# Patient Record
Sex: MALE | Race: WHITE | Employment: UNEMPLOYED | URBAN - METROPOLITAN AREA
[De-identification: names, ages, dates, MRNs, and addresses within clinical notes are randomized per-mention and may not be internally consistent; named-entity substitution may affect disease eponyms.]

---

## 2017-09-22 ENCOUNTER — COMMUNICATION - HEALTHEAST (OUTPATIENT)
Dept: SCHEDULING | Facility: CLINIC | Age: 37
End: 2017-09-22

## 2020-04-06 ENCOUNTER — APPOINTMENT (OUTPATIENT)
Dept: CT IMAGING | Facility: CLINIC | Age: 40
End: 2020-04-06
Attending: NURSE PRACTITIONER
Payer: MEDICAID

## 2020-04-06 ENCOUNTER — HOSPITAL ENCOUNTER (INPATIENT)
Facility: CLINIC | Age: 40
LOS: 4 days | Discharge: HOME | End: 2020-04-10
Attending: NURSE PRACTITIONER | Admitting: FAMILY MEDICINE
Payer: MEDICAID

## 2020-04-06 DIAGNOSIS — K85.20 ALCOHOL-INDUCED ACUTE PANCREATITIS WITHOUT INFECTION OR NECROSIS: ICD-10-CM

## 2020-04-06 DIAGNOSIS — R10.13 ABDOMINAL PAIN, EPIGASTRIC: ICD-10-CM

## 2020-04-06 DIAGNOSIS — E78.1 HYPERTRIGLYCERIDEMIA: Primary | ICD-10-CM

## 2020-04-06 DIAGNOSIS — E87.6 HYPOKALEMIA: ICD-10-CM

## 2020-04-06 DIAGNOSIS — E87.1 HYPONATREMIA: ICD-10-CM

## 2020-04-06 PROBLEM — E78.5 HYPERLIPIDEMIA: Status: ACTIVE | Noted: 2020-04-06

## 2020-04-06 PROBLEM — R74.8 ELEVATED LIPASE: Status: ACTIVE | Noted: 2018-09-11

## 2020-04-06 PROBLEM — F31.9 BIPOLAR AFFECTIVE DISORDER (H): Status: ACTIVE | Noted: 2020-04-06

## 2020-04-06 PROBLEM — I10 HYPERTENSION: Status: ACTIVE | Noted: 2020-04-06

## 2020-04-06 PROBLEM — G47.33 OSA (OBSTRUCTIVE SLEEP APNEA): Status: ACTIVE | Noted: 2020-04-06

## 2020-04-06 LAB
ALBUMIN SERPL-MCNC: 2.2 G/DL (ref 3.4–5)
ALBUMIN UR-MCNC: NEGATIVE MG/DL
ALP SERPL-CCNC: 200 U/L (ref 40–150)
ALT SERPL W P-5'-P-CCNC: 281 U/L (ref 0–70)
ANION GAP SERPL CALCULATED.3IONS-SCNC: 5 MMOL/L (ref 3–14)
APPEARANCE UR: CLEAR
AST SERPL W P-5'-P-CCNC: 308 U/L (ref 0–45)
BASOPHILS # BLD AUTO: 0 10E9/L (ref 0–0.2)
BASOPHILS NFR BLD AUTO: 0 %
BILIRUB SERPL-MCNC: 4.6 MG/DL (ref 0.2–1.3)
BILIRUB UR QL STRIP: NEGATIVE
BUN SERPL-MCNC: 12 MG/DL (ref 7–30)
CALCIUM SERPL-MCNC: 8.1 MG/DL (ref 8.5–10.1)
CHLORIDE SERPL-SCNC: 89 MMOL/L (ref 94–109)
CO2 SERPL-SCNC: 28 MMOL/L (ref 20–32)
COLOR UR AUTO: YELLOW
CREAT SERPL-MCNC: 0.8 MG/DL (ref 0.66–1.25)
DIFFERENTIAL METHOD BLD: ABNORMAL
EOSINOPHIL # BLD AUTO: 0.1 10E9/L (ref 0–0.7)
EOSINOPHIL NFR BLD AUTO: 1 %
ERYTHROCYTE [DISTWIDTH] IN BLOOD BY AUTOMATED COUNT: 13.4 % (ref 10–15)
ETHANOL SERPL-MCNC: <0.01 G/DL
GFR SERPL CREATININE-BSD FRML MDRD: >90 ML/MIN/{1.73_M2}
GLUCOSE SERPL-MCNC: 344 MG/DL (ref 70–99)
GLUCOSE UR STRIP-MCNC: NEGATIVE MG/DL
HCT VFR BLD AUTO: 37.3 % (ref 40–53)
HGB BLD-MCNC: 18.6 G/DL (ref 13.3–17.7)
HGB UR QL STRIP: ABNORMAL
INR PPP: 1.13 (ref 0.86–1.14)
KETONES UR STRIP-MCNC: NEGATIVE MG/DL
LEUKOCYTE ESTERASE UR QL STRIP: NEGATIVE
LIPASE SERPL-CCNC: ABNORMAL U/L (ref 73–393)
LYMPHOCYTES # BLD AUTO: 1.9 10E9/L (ref 0.8–5.3)
LYMPHOCYTES NFR BLD AUTO: 18 %
MAGNESIUM SERPL-MCNC: 1.4 MG/DL (ref 1.6–2.3)
MCH RBC QN AUTO: 46.4 PG (ref 26.5–33)
MCHC RBC AUTO-ENTMCNC: 49.9 G/DL (ref 31.5–36.5)
MCV RBC AUTO: 93 FL (ref 78–100)
MONOCYTES # BLD AUTO: 0.6 10E9/L (ref 0–1.3)
MONOCYTES NFR BLD AUTO: 6 %
MUCOUS THREADS #/AREA URNS LPF: PRESENT /LPF
NEUTROPHILS # BLD AUTO: 8 10E9/L (ref 1.6–8.3)
NEUTROPHILS NFR BLD AUTO: 75 %
NITRATE UR QL: NEGATIVE
PH UR STRIP: 6 PH (ref 5–7)
PLATELET # BLD AUTO: 211 10E9/L (ref 150–450)
POTASSIUM SERPL-SCNC: 3.3 MMOL/L (ref 3.4–5.3)
PROT SERPL-MCNC: 7.1 G/DL (ref 6.8–8.8)
RBC # BLD AUTO: 4.01 10E12/L (ref 4.4–5.9)
RBC #/AREA URNS AUTO: <1 /HPF (ref 0–2)
SODIUM SERPL-SCNC: 122 MMOL/L (ref 133–144)
SOURCE: ABNORMAL
SP GR UR STRIP: 1.03 (ref 1–1.03)
UROBILINOGEN UR STRIP-MCNC: 0 MG/DL (ref 0–2)
VARIANT LYMPHS BLD QL SMEAR: PRESENT
WBC # BLD AUTO: 10.7 10E9/L (ref 4–11)
WBC #/AREA URNS AUTO: <1 /HPF (ref 0–5)

## 2020-04-06 PROCEDURE — 85025 COMPLETE CBC W/AUTO DIFF WBC: CPT | Performed by: EMERGENCY MEDICINE

## 2020-04-06 PROCEDURE — 80320 DRUG SCREEN QUANTALCOHOLS: CPT | Performed by: NURSE PRACTITIONER

## 2020-04-06 PROCEDURE — 96360 HYDRATION IV INFUSION INIT: CPT | Mod: 59 | Performed by: EMERGENCY MEDICINE

## 2020-04-06 PROCEDURE — 25000128 H RX IP 250 OP 636: Performed by: NURSE PRACTITIONER

## 2020-04-06 PROCEDURE — 83036 HEMOGLOBIN GLYCOSYLATED A1C: CPT | Performed by: FAMILY MEDICINE

## 2020-04-06 PROCEDURE — 81001 URINALYSIS AUTO W/SCOPE: CPT | Performed by: NURSE PRACTITIONER

## 2020-04-06 PROCEDURE — 99285 EMERGENCY DEPT VISIT HI MDM: CPT | Mod: Z6 | Performed by: EMERGENCY MEDICINE

## 2020-04-06 PROCEDURE — 25000125 ZZHC RX 250: Performed by: NURSE PRACTITIONER

## 2020-04-06 PROCEDURE — 99285 EMERGENCY DEPT VISIT HI MDM: CPT | Mod: 25 | Performed by: EMERGENCY MEDICINE

## 2020-04-06 PROCEDURE — 85610 PROTHROMBIN TIME: CPT | Performed by: NURSE PRACTITIONER

## 2020-04-06 PROCEDURE — 83735 ASSAY OF MAGNESIUM: CPT | Performed by: EMERGENCY MEDICINE

## 2020-04-06 PROCEDURE — 74177 CT ABD & PELVIS W/CONTRAST: CPT

## 2020-04-06 PROCEDURE — 20000003 ZZH R&B ICU

## 2020-04-06 PROCEDURE — 83690 ASSAY OF LIPASE: CPT | Performed by: EMERGENCY MEDICINE

## 2020-04-06 PROCEDURE — 80053 COMPREHEN METABOLIC PANEL: CPT | Performed by: EMERGENCY MEDICINE

## 2020-04-06 PROCEDURE — 84100 ASSAY OF PHOSPHORUS: CPT | Performed by: NURSE PRACTITIONER

## 2020-04-06 PROCEDURE — 25800030 ZZH RX IP 258 OP 636: Performed by: NURSE PRACTITIONER

## 2020-04-06 RX ORDER — SODIUM CHLORIDE 9 MG/ML
1000 INJECTION, SOLUTION INTRAVENOUS CONTINUOUS
Status: DISCONTINUED | OUTPATIENT
Start: 2020-04-06 | End: 2020-04-07

## 2020-04-06 RX ORDER — LOVASTATIN 10 MG
TABLET ORAL
COMMUNITY
End: 2020-04-06

## 2020-04-06 RX ORDER — QUETIAPINE FUMARATE 200 MG/1
TABLET, FILM COATED ORAL
COMMUNITY
End: 2020-04-06

## 2020-04-06 RX ORDER — ZOLPIDEM TARTRATE 5 MG/1
5 TABLET ORAL
COMMUNITY
Start: 2018-09-18 | End: 2020-04-06

## 2020-04-06 RX ORDER — LITHIUM CARBONATE 300 MG/1
CAPSULE ORAL
COMMUNITY
End: 2020-04-06

## 2020-04-06 RX ORDER — IOPAMIDOL 755 MG/ML
100 INJECTION, SOLUTION INTRAVASCULAR ONCE
Status: COMPLETED | OUTPATIENT
Start: 2020-04-06 | End: 2020-04-06

## 2020-04-06 RX ORDER — LISINOPRIL 10 MG/1
TABLET ORAL
COMMUNITY
End: 2020-04-06

## 2020-04-06 RX ORDER — AMLODIPINE BESYLATE 5 MG/1
5 TABLET ORAL
COMMUNITY
Start: 2018-09-14 | End: 2020-04-06

## 2020-04-06 RX ADMIN — SODIUM CHLORIDE 1000 ML: 9 INJECTION, SOLUTION INTRAVENOUS at 22:45

## 2020-04-06 RX ADMIN — IOPAMIDOL 100 ML: 755 INJECTION, SOLUTION INTRAVENOUS at 22:32

## 2020-04-06 RX ADMIN — SODIUM CHLORIDE 71 ML: 9 INJECTION, SOLUTION INTRAVENOUS at 22:32

## 2020-04-06 RX ADMIN — FOLIC ACID: 5 INJECTION, SOLUTION INTRAMUSCULAR; INTRAVENOUS; SUBCUTANEOUS at 23:34

## 2020-04-06 ASSESSMENT — MIFFLIN-ST. JEOR: SCORE: 2012.62

## 2020-04-07 PROBLEM — K85.90 ACUTE PANCREATITIS: Status: ACTIVE | Noted: 2020-04-07

## 2020-04-07 LAB
ALBUMIN SERPL-MCNC: 2.6 G/DL (ref 3.4–5)
ALP SERPL-CCNC: 170 U/L (ref 40–150)
ALT SERPL W P-5'-P-CCNC: 136 U/L (ref 0–70)
ANION GAP SERPL CALCULATED.3IONS-SCNC: 10 MMOL/L (ref 3–14)
ANION GAP SERPL CALCULATED.3IONS-SCNC: 3 MMOL/L (ref 3–14)
AST SERPL W P-5'-P-CCNC: 193 U/L (ref 0–45)
BILIRUB DIRECT SERPL-MCNC: 1.7 MG/DL (ref 0–0.2)
BILIRUB SERPL-MCNC: 2.6 MG/DL (ref 0.2–1.3)
BUN SERPL-MCNC: 17 MG/DL (ref 7–30)
BUN SERPL-MCNC: 8 MG/DL (ref 7–30)
CALCIUM SERPL-MCNC: 7.7 MG/DL (ref 8.5–10.1)
CALCIUM SERPL-MCNC: 8.1 MG/DL (ref 8.5–10.1)
CHLORIDE SERPL-SCNC: 100 MMOL/L (ref 94–109)
CHLORIDE SERPL-SCNC: 93 MMOL/L (ref 94–109)
CHOLEST SERPL-MCNC: 311 MG/DL
CO2 SERPL-SCNC: 22 MMOL/L (ref 20–32)
CO2 SERPL-SCNC: 30 MMOL/L (ref 20–32)
CREAT SERPL-MCNC: 0.68 MG/DL (ref 0.66–1.25)
CREAT SERPL-MCNC: <0.14 MG/DL (ref 0.66–1.25)
ERYTHROCYTE [DISTWIDTH] IN BLOOD BY AUTOMATED COUNT: 13.5 % (ref 10–15)
GFR SERPL CREATININE-BSD FRML MDRD: >90 ML/MIN/{1.73_M2}
GFR SERPL CREATININE-BSD FRML MDRD: >90 ML/MIN/{1.73_M2}
GLUCOSE BLDC GLUCOMTR-MCNC: 132 MG/DL (ref 70–99)
GLUCOSE BLDC GLUCOMTR-MCNC: 136 MG/DL (ref 70–99)
GLUCOSE BLDC GLUCOMTR-MCNC: 137 MG/DL (ref 70–99)
GLUCOSE BLDC GLUCOMTR-MCNC: 144 MG/DL (ref 70–99)
GLUCOSE BLDC GLUCOMTR-MCNC: 148 MG/DL (ref 70–99)
GLUCOSE BLDC GLUCOMTR-MCNC: 150 MG/DL (ref 70–99)
GLUCOSE BLDC GLUCOMTR-MCNC: 151 MG/DL (ref 70–99)
GLUCOSE BLDC GLUCOMTR-MCNC: 151 MG/DL (ref 70–99)
GLUCOSE BLDC GLUCOMTR-MCNC: 156 MG/DL (ref 70–99)
GLUCOSE BLDC GLUCOMTR-MCNC: 164 MG/DL (ref 70–99)
GLUCOSE BLDC GLUCOMTR-MCNC: 164 MG/DL (ref 70–99)
GLUCOSE BLDC GLUCOMTR-MCNC: 166 MG/DL (ref 70–99)
GLUCOSE BLDC GLUCOMTR-MCNC: 175 MG/DL (ref 70–99)
GLUCOSE BLDC GLUCOMTR-MCNC: 201 MG/DL (ref 70–99)
GLUCOSE SERPL-MCNC: 153 MG/DL (ref 70–99)
GLUCOSE SERPL-MCNC: 308 MG/DL (ref 70–99)
HBA1C MFR BLD: 6.1 % (ref 0–5.6)
HCT VFR BLD AUTO: 36 % (ref 40–53)
HDLC SERPL-MCNC: 19 MG/DL
HGB BLD-MCNC: ABNORMAL G/DL (ref 13.3–17.7)
INR PPP: 1.15 (ref 0.86–1.14)
LDLC SERPL CALC-MCNC: ABNORMAL MG/DL
LIPASE SERPL-CCNC: ABNORMAL U/L (ref 73–393)
MAGNESIUM SERPL-MCNC: 2.1 MG/DL (ref 1.6–2.3)
MCH RBC QN AUTO: ABNORMAL PG (ref 26.5–33)
MCHC RBC AUTO-ENTMCNC: ABNORMAL G/DL (ref 31.5–36.5)
MCV RBC AUTO: 94 FL (ref 78–100)
NONHDLC SERPL-MCNC: 292 MG/DL
PHOSPHATE SERPL-MCNC: 2.6 MG/DL (ref 2.5–4.5)
PHOSPHATE SERPL-MCNC: 3.7 MG/DL (ref 2.5–4.5)
PLATELET # BLD AUTO: 206 10E9/L (ref 150–450)
POTASSIUM SERPL-SCNC: 3.5 MMOL/L (ref 3.4–5.3)
POTASSIUM SERPL-SCNC: 3.7 MMOL/L (ref 3.4–5.3)
POTASSIUM SERPL-SCNC: 3.9 MMOL/L (ref 3.4–5.3)
PROT SERPL-MCNC: 6.7 G/DL (ref 6.8–8.8)
RBC # BLD AUTO: 3.84 10E12/L (ref 4.4–5.9)
SODIUM SERPL-SCNC: 125 MMOL/L (ref 133–144)
SODIUM SERPL-SCNC: 126 MMOL/L (ref 133–144)
SODIUM SERPL-SCNC: 130 MMOL/L (ref 133–144)
SODIUM SERPL-SCNC: 132 MMOL/L (ref 133–144)
TRIGL SERPL-MCNC: 2252 MG/DL
WBC # BLD AUTO: 10.2 10E9/L (ref 4–11)

## 2020-04-07 PROCEDURE — 25800030 ZZH RX IP 258 OP 636: Performed by: FAMILY MEDICINE

## 2020-04-07 PROCEDURE — 80048 BASIC METABOLIC PNL TOTAL CA: CPT | Performed by: NURSE PRACTITIONER

## 2020-04-07 PROCEDURE — 25000132 ZZH RX MED GY IP 250 OP 250 PS 637: Performed by: NURSE PRACTITIONER

## 2020-04-07 PROCEDURE — 25000131 ZZH RX MED GY IP 250 OP 636 PS 637: Performed by: PHYSICIAN ASSISTANT

## 2020-04-07 PROCEDURE — 25800029 ZZH RX IP 258 OP 250: Performed by: FAMILY MEDICINE

## 2020-04-07 PROCEDURE — 25000128 H RX IP 250 OP 636: Performed by: FAMILY MEDICINE

## 2020-04-07 PROCEDURE — 99207 ZZC APP CREDIT; MD BILLING SHARED VISIT: CPT | Performed by: PHYSICIAN ASSISTANT

## 2020-04-07 PROCEDURE — 25800030 ZZH RX IP 258 OP 636: Performed by: PHYSICIAN ASSISTANT

## 2020-04-07 PROCEDURE — 80076 HEPATIC FUNCTION PANEL: CPT | Performed by: PHYSICIAN ASSISTANT

## 2020-04-07 PROCEDURE — 25000132 ZZH RX MED GY IP 250 OP 250 PS 637: Performed by: PHYSICIAN ASSISTANT

## 2020-04-07 PROCEDURE — 36415 COLL VENOUS BLD VENIPUNCTURE: CPT | Performed by: PHYSICIAN ASSISTANT

## 2020-04-07 PROCEDURE — 25000125 ZZHC RX 250: Performed by: FAMILY MEDICINE

## 2020-04-07 PROCEDURE — 84295 ASSAY OF SERUM SODIUM: CPT | Performed by: PHYSICIAN ASSISTANT

## 2020-04-07 PROCEDURE — 85027 COMPLETE CBC AUTOMATED: CPT | Performed by: PHYSICIAN ASSISTANT

## 2020-04-07 PROCEDURE — 85610 PROTHROMBIN TIME: CPT | Performed by: PHYSICIAN ASSISTANT

## 2020-04-07 PROCEDURE — 25800029 ZZH RX IP 258 OP 250: Performed by: PHYSICIAN ASSISTANT

## 2020-04-07 PROCEDURE — 20000003 ZZH R&B ICU

## 2020-04-07 PROCEDURE — 00000146 ZZHCL STATISTIC GLUCOSE BY METER IP

## 2020-04-07 PROCEDURE — 83690 ASSAY OF LIPASE: CPT | Performed by: PHYSICIAN ASSISTANT

## 2020-04-07 PROCEDURE — 99223 1ST HOSP IP/OBS HIGH 75: CPT | Mod: AI | Performed by: FAMILY MEDICINE

## 2020-04-07 PROCEDURE — 84100 ASSAY OF PHOSPHORUS: CPT | Performed by: PHYSICIAN ASSISTANT

## 2020-04-07 PROCEDURE — C9113 INJ PANTOPRAZOLE SODIUM, VIA: HCPCS | Performed by: PHYSICIAN ASSISTANT

## 2020-04-07 PROCEDURE — 80061 LIPID PANEL: CPT | Performed by: PHYSICIAN ASSISTANT

## 2020-04-07 PROCEDURE — 83735 ASSAY OF MAGNESIUM: CPT | Performed by: NURSE PRACTITIONER

## 2020-04-07 PROCEDURE — 25000132 ZZH RX MED GY IP 250 OP 250 PS 637: Performed by: FAMILY MEDICINE

## 2020-04-07 PROCEDURE — 25000128 H RX IP 250 OP 636: Performed by: PHYSICIAN ASSISTANT

## 2020-04-07 PROCEDURE — 83735 ASSAY OF MAGNESIUM: CPT | Performed by: PHYSICIAN ASSISTANT

## 2020-04-07 PROCEDURE — 80048 BASIC METABOLIC PNL TOTAL CA: CPT | Performed by: PHYSICIAN ASSISTANT

## 2020-04-07 PROCEDURE — 84132 ASSAY OF SERUM POTASSIUM: CPT | Performed by: PHYSICIAN ASSISTANT

## 2020-04-07 RX ORDER — METOCLOPRAMIDE HYDROCHLORIDE 5 MG/ML
10 INJECTION INTRAMUSCULAR; INTRAVENOUS EVERY 6 HOURS PRN
Status: DISCONTINUED | OUTPATIENT
Start: 2020-04-07 | End: 2020-04-10 | Stop reason: HOSPADM

## 2020-04-07 RX ORDER — HYDROMORPHONE HYDROCHLORIDE 1 MG/ML
.3-.5 INJECTION, SOLUTION INTRAMUSCULAR; INTRAVENOUS; SUBCUTANEOUS
Status: DISCONTINUED | OUTPATIENT
Start: 2020-04-07 | End: 2020-04-07

## 2020-04-07 RX ORDER — ONDANSETRON 4 MG/1
4 TABLET, ORALLY DISINTEGRATING ORAL EVERY 6 HOURS PRN
Status: DISCONTINUED | OUTPATIENT
Start: 2020-04-07 | End: 2020-04-10 | Stop reason: HOSPADM

## 2020-04-07 RX ORDER — LORAZEPAM 0.5 MG/1
0.5 TABLET ORAL ONCE
Status: COMPLETED | OUTPATIENT
Start: 2020-04-07 | End: 2020-04-07

## 2020-04-07 RX ORDER — NALOXONE HYDROCHLORIDE 0.4 MG/ML
.1-.4 INJECTION, SOLUTION INTRAMUSCULAR; INTRAVENOUS; SUBCUTANEOUS
Status: DISCONTINUED | OUTPATIENT
Start: 2020-04-07 | End: 2020-04-08

## 2020-04-07 RX ORDER — NICOTINE POLACRILEX 4 MG
15-30 LOZENGE BUCCAL
Status: DISCONTINUED | OUTPATIENT
Start: 2020-04-07 | End: 2020-04-10 | Stop reason: HOSPADM

## 2020-04-07 RX ORDER — POTASSIUM CL/LIDO/0.9 % NACL 10MEQ/0.1L
10 INTRAVENOUS SOLUTION, PIGGYBACK (ML) INTRAVENOUS
Status: DISCONTINUED | OUTPATIENT
Start: 2020-04-07 | End: 2020-04-10 | Stop reason: HOSPADM

## 2020-04-07 RX ORDER — ONDANSETRON 4 MG/1
4 TABLET, ORALLY DISINTEGRATING ORAL EVERY 6 HOURS PRN
Status: DISCONTINUED | OUTPATIENT
Start: 2020-04-07 | End: 2020-04-07

## 2020-04-07 RX ORDER — PROCHLORPERAZINE 25 MG
25 SUPPOSITORY, RECTAL RECTAL EVERY 12 HOURS PRN
Status: DISCONTINUED | OUTPATIENT
Start: 2020-04-07 | End: 2020-04-10 | Stop reason: HOSPADM

## 2020-04-07 RX ORDER — SODIUM CHLORIDE 450 MG/100ML
INJECTION, SOLUTION INTRAVENOUS CONTINUOUS
Status: DISCONTINUED | OUTPATIENT
Start: 2020-04-07 | End: 2020-04-07

## 2020-04-07 RX ORDER — LIDOCAINE 40 MG/G
CREAM TOPICAL
Status: DISCONTINUED | OUTPATIENT
Start: 2020-04-07 | End: 2020-04-10 | Stop reason: HOSPADM

## 2020-04-07 RX ORDER — ONDANSETRON 2 MG/ML
4 INJECTION INTRAMUSCULAR; INTRAVENOUS EVERY 6 HOURS PRN
Status: DISCONTINUED | OUTPATIENT
Start: 2020-04-07 | End: 2020-04-07

## 2020-04-07 RX ORDER — PROCHLORPERAZINE MALEATE 10 MG
10 TABLET ORAL EVERY 6 HOURS PRN
Status: DISCONTINUED | OUTPATIENT
Start: 2020-04-07 | End: 2020-04-10 | Stop reason: HOSPADM

## 2020-04-07 RX ORDER — HYDROMORPHONE HYDROCHLORIDE 1 MG/ML
0.2 INJECTION, SOLUTION INTRAMUSCULAR; INTRAVENOUS; SUBCUTANEOUS
Status: DISCONTINUED | OUTPATIENT
Start: 2020-04-07 | End: 2020-04-10 | Stop reason: HOSPADM

## 2020-04-07 RX ORDER — MULTIPLE VITAMINS W/ MINERALS TAB 9MG-400MCG
1 TAB ORAL DAILY
Status: DISCONTINUED | OUTPATIENT
Start: 2020-04-07 | End: 2020-04-10 | Stop reason: HOSPADM

## 2020-04-07 RX ORDER — FOLIC ACID 1 MG/1
1 TABLET ORAL DAILY
Status: DISCONTINUED | OUTPATIENT
Start: 2020-04-07 | End: 2020-04-10 | Stop reason: HOSPADM

## 2020-04-07 RX ORDER — DESMOPRESSIN ACETATE 4 UG/ML
2 INJECTION, SOLUTION INTRAVENOUS; SUBCUTANEOUS EVERY 8 HOURS
Status: DISCONTINUED | OUTPATIENT
Start: 2020-04-07 | End: 2020-04-07 | Stop reason: CLARIF

## 2020-04-07 RX ORDER — POTASSIUM CHLORIDE 29.8 MG/ML
20 INJECTION INTRAVENOUS
Status: DISCONTINUED | OUTPATIENT
Start: 2020-04-07 | End: 2020-04-07 | Stop reason: CLARIF

## 2020-04-07 RX ORDER — DESMOPRESSIN ACETATE 4 UG/ML
1 INJECTION, SOLUTION INTRAVENOUS; SUBCUTANEOUS EVERY 8 HOURS
Status: DISCONTINUED | OUTPATIENT
Start: 2020-04-07 | End: 2020-04-07

## 2020-04-07 RX ORDER — POTASSIUM CHLORIDE 1.5 G/1.58G
20-40 POWDER, FOR SOLUTION ORAL
Status: DISCONTINUED | OUTPATIENT
Start: 2020-04-07 | End: 2020-04-10 | Stop reason: HOSPADM

## 2020-04-07 RX ORDER — ACETAMINOPHEN 325 MG/1
650 TABLET ORAL EVERY 4 HOURS PRN
Status: DISCONTINUED | OUTPATIENT
Start: 2020-04-07 | End: 2020-04-10 | Stop reason: HOSPADM

## 2020-04-07 RX ORDER — SODIUM CHLORIDE 9 MG/ML
INJECTION, SOLUTION INTRAVENOUS CONTINUOUS
Status: DISCONTINUED | OUTPATIENT
Start: 2020-04-07 | End: 2020-04-10

## 2020-04-07 RX ORDER — DEXTROSE MONOHYDRATE 25 G/50ML
25-50 INJECTION, SOLUTION INTRAVENOUS
Status: DISCONTINUED | OUTPATIENT
Start: 2020-04-07 | End: 2020-04-08

## 2020-04-07 RX ORDER — LORAZEPAM 2 MG/ML
1-2 INJECTION INTRAMUSCULAR EVERY 30 MIN PRN
Status: DISCONTINUED | OUTPATIENT
Start: 2020-04-07 | End: 2020-04-10 | Stop reason: HOSPADM

## 2020-04-07 RX ORDER — METOCLOPRAMIDE 10 MG/1
10 TABLET ORAL EVERY 6 HOURS PRN
Status: DISCONTINUED | OUTPATIENT
Start: 2020-04-07 | End: 2020-04-10 | Stop reason: HOSPADM

## 2020-04-07 RX ORDER — MAGNESIUM SULFATE HEPTAHYDRATE 40 MG/ML
4 INJECTION, SOLUTION INTRAVENOUS EVERY 4 HOURS PRN
Status: DISCONTINUED | OUTPATIENT
Start: 2020-04-07 | End: 2020-04-10 | Stop reason: HOSPADM

## 2020-04-07 RX ORDER — ONDANSETRON 2 MG/ML
4 INJECTION INTRAMUSCULAR; INTRAVENOUS EVERY 6 HOURS PRN
Status: DISCONTINUED | OUTPATIENT
Start: 2020-04-07 | End: 2020-04-10 | Stop reason: HOSPADM

## 2020-04-07 RX ORDER — KETOROLAC TROMETHAMINE 15 MG/ML
15 INJECTION, SOLUTION INTRAMUSCULAR; INTRAVENOUS EVERY 6 HOURS PRN
Status: DISCONTINUED | OUTPATIENT
Start: 2020-04-07 | End: 2020-04-10 | Stop reason: HOSPADM

## 2020-04-07 RX ORDER — POLYETHYLENE GLYCOL 3350 17 G/17G
17 POWDER, FOR SOLUTION ORAL DAILY PRN
Status: DISCONTINUED | OUTPATIENT
Start: 2020-04-07 | End: 2020-04-10 | Stop reason: HOSPADM

## 2020-04-07 RX ORDER — POTASSIUM CHLORIDE 1500 MG/1
20-40 TABLET, EXTENDED RELEASE ORAL
Status: DISCONTINUED | OUTPATIENT
Start: 2020-04-07 | End: 2020-04-10 | Stop reason: HOSPADM

## 2020-04-07 RX ORDER — NALOXONE HYDROCHLORIDE 0.4 MG/ML
.1-.4 INJECTION, SOLUTION INTRAMUSCULAR; INTRAVENOUS; SUBCUTANEOUS
Status: DISCONTINUED | OUTPATIENT
Start: 2020-04-07 | End: 2020-04-07

## 2020-04-07 RX ORDER — NICOTINE POLACRILEX 4 MG
15-30 LOZENGE BUCCAL
Status: DISCONTINUED | OUTPATIENT
Start: 2020-04-07 | End: 2020-04-08

## 2020-04-07 RX ORDER — POTASSIUM CHLORIDE 7.45 MG/ML
10 INJECTION INTRAVENOUS
Status: DISCONTINUED | OUTPATIENT
Start: 2020-04-07 | End: 2020-04-10 | Stop reason: HOSPADM

## 2020-04-07 RX ORDER — NALOXONE HYDROCHLORIDE 0.4 MG/ML
.1-.4 INJECTION, SOLUTION INTRAMUSCULAR; INTRAVENOUS; SUBCUTANEOUS
Status: DISCONTINUED | OUTPATIENT
Start: 2020-04-07 | End: 2020-04-10 | Stop reason: HOSPADM

## 2020-04-07 RX ORDER — LORAZEPAM 1 MG/1
1-2 TABLET ORAL EVERY 30 MIN PRN
Status: DISCONTINUED | OUTPATIENT
Start: 2020-04-07 | End: 2020-04-10 | Stop reason: HOSPADM

## 2020-04-07 RX ORDER — DESMOPRESSIN ACETATE 4 UG/ML
2 INJECTION, SOLUTION INTRAVENOUS; SUBCUTANEOUS ONCE
Status: DISCONTINUED | OUTPATIENT
Start: 2020-04-07 | End: 2020-04-07 | Stop reason: CLARIF

## 2020-04-07 RX ORDER — DEXTROSE MONOHYDRATE 25 G/50ML
25-50 INJECTION, SOLUTION INTRAVENOUS
Status: DISCONTINUED | OUTPATIENT
Start: 2020-04-07 | End: 2020-04-10 | Stop reason: HOSPADM

## 2020-04-07 RX ORDER — LABETALOL 20 MG/4 ML (5 MG/ML) INTRAVENOUS SYRINGE
20 EVERY 4 HOURS PRN
Status: DISCONTINUED | OUTPATIENT
Start: 2020-04-07 | End: 2020-04-10 | Stop reason: HOSPADM

## 2020-04-07 RX ORDER — LANOLIN ALCOHOL/MO/W.PET/CERES
100 CREAM (GRAM) TOPICAL DAILY
Status: DISCONTINUED | OUTPATIENT
Start: 2020-04-07 | End: 2020-04-10 | Stop reason: HOSPADM

## 2020-04-07 RX ADMIN — FAMOTIDINE 20 MG: 20 INJECTION, SOLUTION INTRAVENOUS at 09:41

## 2020-04-07 RX ADMIN — MAGNESIUM SULFATE HEPTAHYDRATE 4 G: 40 INJECTION, SOLUTION INTRAVENOUS at 01:49

## 2020-04-07 RX ADMIN — LORAZEPAM 0.5 MG: 0.5 TABLET ORAL at 00:18

## 2020-04-07 RX ADMIN — Medication 1 MG: at 02:45

## 2020-04-07 RX ADMIN — FAMOTIDINE 20 MG: 20 INJECTION, SOLUTION INTRAVENOUS at 20:32

## 2020-04-07 RX ADMIN — LABETALOL 20 MG/4 ML (5 MG/ML) INTRAVENOUS SYRINGE 20 MG: at 02:06

## 2020-04-07 RX ADMIN — HYDROMORPHONE HYDROCHLORIDE 0.2 MG: 1 INJECTION, SOLUTION INTRAMUSCULAR; INTRAVENOUS; SUBCUTANEOUS at 10:27

## 2020-04-07 RX ADMIN — POTASSIUM CHLORIDE: 2 INJECTION, SOLUTION, CONCENTRATE INTRAVENOUS at 21:46

## 2020-04-07 RX ADMIN — DESMOPRESSIN ACETATE 2 MCG: 4 SOLUTION INTRAVENOUS at 21:22

## 2020-04-07 RX ADMIN — DESMOPRESSIN ACETATE 2 MCG: 4 SOLUTION INTRAVENOUS at 09:06

## 2020-04-07 RX ADMIN — PANTOPRAZOLE SODIUM 40 MG: 40 INJECTION, POWDER, LYOPHILIZED, FOR SOLUTION INTRAVENOUS at 09:01

## 2020-04-07 RX ADMIN — POTASSIUM CHLORIDE: 2 INJECTION, SOLUTION, CONCENTRATE INTRAVENOUS at 12:10

## 2020-04-07 RX ADMIN — MULTIPLE VITAMINS W/ MINERALS TAB 1 TABLET: TAB at 07:55

## 2020-04-07 RX ADMIN — SODIUM CHLORIDE: 4.5 INJECTION, SOLUTION INTRAVENOUS at 01:45

## 2020-04-07 RX ADMIN — DEXTROSE MONOHYDRATE 600 ML: 50 INJECTION, SOLUTION INTRAVENOUS at 09:05

## 2020-04-07 RX ADMIN — FOLIC ACID 1 MG: 1 TABLET ORAL at 07:55

## 2020-04-07 RX ADMIN — HYDROMORPHONE HYDROCHLORIDE 0.2 MG: 1 INJECTION, SOLUTION INTRAMUSCULAR; INTRAVENOUS; SUBCUTANEOUS at 13:27

## 2020-04-07 RX ADMIN — POTASSIUM CHLORIDE: 2 INJECTION, SOLUTION, CONCENTRATE INTRAVENOUS at 14:20

## 2020-04-07 RX ADMIN — HYDROMORPHONE HYDROCHLORIDE 0.5 MG: 1 INJECTION, SOLUTION INTRAMUSCULAR; INTRAVENOUS; SUBCUTANEOUS at 07:21

## 2020-04-07 RX ADMIN — HYDROMORPHONE HYDROCHLORIDE 0.2 MG: 1 INJECTION, SOLUTION INTRAMUSCULAR; INTRAVENOUS; SUBCUTANEOUS at 21:48

## 2020-04-07 RX ADMIN — KETOROLAC TROMETHAMINE 15 MG: 15 INJECTION, SOLUTION INTRAMUSCULAR; INTRAVENOUS at 08:59

## 2020-04-07 RX ADMIN — SODIUM CHLORIDE 2 UNITS/HR: 9 INJECTION, SOLUTION INTRAVENOUS at 11:59

## 2020-04-07 RX ADMIN — KETOROLAC TROMETHAMINE 15 MG: 15 INJECTION, SOLUTION INTRAMUSCULAR; INTRAVENOUS at 17:39

## 2020-04-07 RX ADMIN — THIAMINE HCL TAB 100 MG 100 MG: 100 TAB at 07:55

## 2020-04-07 RX ADMIN — ENOXAPARIN SODIUM 40 MG: 40 INJECTION SUBCUTANEOUS at 01:50

## 2020-04-07 RX ADMIN — SODIUM CHLORIDE: 9 INJECTION, SOLUTION INTRAVENOUS at 12:09

## 2020-04-07 ASSESSMENT — ENCOUNTER SYMPTOMS
FEVER: 1
APPETITE CHANGE: 0
COUGH: 0
CONSTIPATION: 0
VOMITING: 0
BLOOD IN STOOL: 0
ABDOMINAL PAIN: 1
MUSCULOSKELETAL NEGATIVE: 1
ALLERGIC/IMMUNOLOGIC NEGATIVE: 1
DIARRHEA: 0
NEUROLOGICAL NEGATIVE: 1
NAUSEA: 0
CHILLS: 0
SHORTNESS OF BREATH: 0

## 2020-04-07 ASSESSMENT — MIFFLIN-ST. JEOR
SCORE: 2023.63
SCORE: 2020.63
SCORE: 2019.63

## 2020-04-07 ASSESSMENT — ACTIVITIES OF DAILY LIVING (ADL)
ADLS_ACUITY_SCORE: 10

## 2020-04-07 NOTE — ED PROVIDER NOTES
History     Chief Complaint   Patient presents with     Abdominal Pain     ruq pain for past 2 weeks and worse tonight, pain in rt upper back     HPI  Bertram Rivas is a 39 year old male with history of hypertension, hyperlipidemia, GERD, alcohol abuse, and bipolar affective disorder who presents to the emergency department for evaluation of epigastric pain that radiates into the right side and around his back.  Symptoms started 2 weeks ago.  Episodes of nausea and vomiting last week, but none at this time.  Today he reports his pain suddenly became worse at 6:30 PM after he ate a hamburger. Low grade fever here, but denies any chills or known fevers since this started. Patient drinks 750 ml of Vodka daily. Every day smoker. Denies any illicit drug use, but does say he used to use methamphetamine in the past. Patient previously prescribed hydrochlorothiazide but stopped in 2018 after he was hospitalized with JOSHUA and has not restarted any of his prior medications. No history of seizures when stopping alcohol but patient does indicate that he does have difficulty stopping drinking and concern for DTs.    Previous Records in Care Everywhere were Reviewed:  Allergies    No Known Allergies    Medications  Reconcile with Patient's Chart  Medication Sig Dispensed Refills Start Date End Date Status   SEROQUEL 200 MG TAB   take 1 tablet (200mg) by oral route 1 time per day for Anti-psychotic med.   0     Active   SEROQUEL 200 MG TAB   take 3 tablets (600mg) by oral route at bedtime for Anti-psychotic   0     Active   LITHIUM CARBONATE 600 MG CAP   take 2 capsules (1200mg) by oral route 2 times per day for BiPolar disorder   0     Active   LOVASTATIN 10 MG TAB   take 1 tablet (10mg) by oral route once daily with evening meal for high cholesterol   0     Active   TRICOR ORAL   take 1 tablet by mouth once a day for Tryglyceride problem   0     Active   LITHIUM CARBONATE 300 MG CAP   take 3 capsule (300mg) by oral route 3  times per day   0     Active   ADVIL 200 MG TAB   take 1 tablet (200 mg) by oral route every 6 hours as needed with food   0     Active   PENICILLIN V POTASSIUM 500 MG TAB   take 1 tablet (500 mg) by oral route every 8 hours 30   0 11/02/2007   Active   predniSONE (DELTASONE) 10 mg tablet    Indications: Plant dermatitis, Rash Start taking 6 tablet daily and then decrease dose by 1 tab every 3 days until finished. 63 tablet   0 05/05/2015   Active   hydrOXYzine HCl (ATARAX) 25 mg tablet    Indications: Plant dermatitis, Rash Take 1 tablet by mouth 4 times daily. 20 tablet   0 05/05/2015   Active     Active Problems    Not on file      Surgical History    Surgery Date Site/Laterality Comments   W[place] UNLISTED PROCEDURE HANDS/FINGERS     Right       Medical History    Medical History Date Comments   Bipolar disorder, unspecified (HC)       Other and unspecified hyperlipidemia         Family History    Relation Name Status Comments   Father   Alive     Mother   Alive       Social History    Tobacco Use Types Packs/Day Years Used Date   Never Smoker           Smokeless Tobacco: Never Used               Allergies:  No Known Allergies    Problem List:    Patient Active Problem List    Diagnosis Date Noted     Bipolar affective disorder (H) 04/06/2020     Priority: Medium     Hyperlipidemia 04/06/2020     Priority: Medium     Hypertension 04/06/2020     Priority: Medium     YULISSA (obstructive sleep apnea) 04/06/2020     Priority: Medium     Elevated lipase 09/11/2018     Priority: Medium     GERD (gastroesophageal reflux disease) 10/08/2010     Priority: Medium        Past Medical History:    No past medical history on file.    Past Surgical History:    No past surgical history on file.    Family History:    No family history on file.    Social History:  Marital Status:  Single [1]  Social History     Tobacco Use     Smoking status: Not on file   Substance Use Topics     Alcohol use: Not on file     Drug use: Not on file     "    Medications:    VITAMIN D, CHOLECALCIFEROL, PO          Review of Systems   Constitutional: Positive for fever. Negative for appetite change and chills.   HENT: Negative.    Respiratory: Negative for cough and shortness of breath.    Cardiovascular: Negative for chest pain.   Gastrointestinal: Positive for abdominal pain. Negative for blood in stool, constipation, diarrhea, nausea and vomiting.   Genitourinary: Negative.    Musculoskeletal: Negative.    Skin: Negative.    Allergic/Immunologic: Negative.    Neurological: Negative.        Physical Exam   BP: (!) 200/126  Pulse: 105  Temp: 100.6  F (38.1  C)  Height: 170.2 cm (5' 7\")  Weight: 113.4 kg (250 lb)  SpO2: 96 %      Physical Exam  Vitals signs and nursing note reviewed.   Constitutional:       General: He is in acute distress.      Appearance: He is well-developed. He is obese. He is not toxic-appearing.   HENT:      Head: Normocephalic and atraumatic.      Mouth/Throat:      Mouth: Mucous membranes are moist.      Pharynx: Oropharynx is clear.   Cardiovascular:      Rate and Rhythm: Normal rate and regular rhythm.   Pulmonary:      Effort: Pulmonary effort is normal.      Breath sounds: Normal breath sounds.   Abdominal:      General: There is no distension.      Palpations: Abdomen is soft.      Tenderness: There is abdominal tenderness in the right upper quadrant, epigastric area and left upper quadrant.   Skin:     General: Skin is warm and dry.   Neurological:      General: No focal deficit present.      Mental Status: He is alert and oriented to person, place, and time.   Psychiatric:         Mood and Affect: Mood normal.         Behavior: Behavior normal.         ED Course        Procedures               Results for orders placed or performed during the hospital encounter of 04/06/20 (from the past 24 hour(s))   CBC with platelets, differential   Result Value Ref Range    WBC 10.7 4.0 - 11.0 10e9/L    RBC Count 4.01 (L) 4.4 - 5.9 10e12/L    " Hemoglobin 18.6 (H) 13.3 - 17.7 g/dL    Hematocrit 37.3 (L) 40.0 - 53.0 %    MCV 93 78 - 100 fl    MCH 46.4 (H) 26.5 - 33.0 pg    MCHC 49.9 (H) 31.5 - 36.5 g/dL    RDW 13.4 10.0 - 15.0 %    Platelet Count 211 150 - 450 10e9/L    Diff Method Manual Differential     % Neutrophils 75.0 %    % Lymphocytes 18.0 %    % Monocytes 6.0 %    % Eosinophils 1.0 %    % Basophils 0.0 %    Absolute Neutrophil 8.0 1.6 - 8.3 10e9/L    Absolute Lymphocytes 1.9 0.8 - 5.3 10e9/L    Absolute Monocytes 0.6 0.0 - 1.3 10e9/L    Absolute Eosinophils 0.1 0.0 - 0.7 10e9/L    Absolute Basophils 0.0 0.0 - 0.2 10e9/L    Reactive Lymphs Present    Comprehensive metabolic panel   Result Value Ref Range    Sodium 122 (L) 133 - 144 mmol/L    Potassium 3.3 (L) 3.4 - 5.3 mmol/L    Chloride 89 (L) 94 - 109 mmol/L    Carbon Dioxide 28 20 - 32 mmol/L    Anion Gap 5 3 - 14 mmol/L    Glucose 344 (H) 70 - 99 mg/dL    Urea Nitrogen 12 7 - 30 mg/dL    Creatinine 0.80 0.66 - 1.25 mg/dL    GFR Estimate >90 >60 mL/min/[1.73_m2]    GFR Estimate If Black >90 >60 mL/min/[1.73_m2]    Calcium 8.1 (L) 8.5 - 10.1 mg/dL    Bilirubin Total 4.6 (H) 0.2 - 1.3 mg/dL    Albumin 2.2 (L) 3.4 - 5.0 g/dL    Protein Total 7.1 6.8 - 8.8 g/dL    Alkaline Phosphatase 200 (H) 40 - 150 U/L     (H) 0 - 70 U/L     (H) 0 - 45 U/L   Lipase   Result Value Ref Range    Lipase 28,478 (H) 73 - 393 U/L   INR   Result Value Ref Range    INR 1.13 0.86 - 1.14   Alcohol level blood   Result Value Ref Range    Ethanol g/dL <0.01 <0.01 g/dL   Magnesium   Result Value Ref Range    Magnesium 1.4 (L) 1.6 - 2.3 mg/dL   Phosphorus   Result Value Ref Range    Phosphorus 3.7 2.5 - 4.5 mg/dL   CT Abdomen Pelvis w Contrast    Narrative    EXAM: CT ABDOMEN PELVIS W CONTRAST  LOCATION: [facility]  DATE/TIME: 4/6/2020 10:31 PM    INDICATION: Upper abdominal pain  COMPARISON: None.  TECHNIQUE: CT scan of the abdomen and pelvis was performed following injection of IV contrast.  Multiplanar reformats were obtained. Dose reduction techniques were used.  CONTRAST: 100mL Isovue-370    FINDINGS:   LOWER CHEST: Mild fibroatelectasis with no focal infiltrate.    HEPATOBILIARY: Dense hepatic steatosis. Gallbladder unremarkable.    PANCREAS: Diffuse pancreatic and peripancreatic edema. No focal fluid collection.    SPLEEN: Normal.    ADRENAL GLANDS: Normal.    KIDNEYS/BLADDER: Normal.    BOWEL: Normal.    LYMPH NODES: Normal.    VASCULATURE: Unremarkable.    PELVIC ORGANS: Normal.    MUSCULOSKELETAL: Tiny fat-containing paraumbilical hernia. Degenerative disease. Bilateral spondylolysis with minimal spondylolisthesis of L4 on L5 assuming 5 lumbar type vertebral bodies.      Impression    IMPRESSION:   1.  Evidence for acute interstitial pancreatitis.  2.  Dense hepatic steatosis.     UA reflex to Microscopic   Result Value Ref Range    Color Urine Yellow     Appearance Urine Clear     Glucose Urine Negative NEG^Negative mg/dL    Bilirubin Urine Negative NEG^Negative    Ketones Urine Negative NEG^Negative mg/dL    Specific Gravity Urine 1.030 1.003 - 1.035    Blood Urine Small (A) NEG^Negative    pH Urine 6.0 5.0 - 7.0 pH    Protein Albumin Urine Negative NEG^Negative mg/dL    Urobilinogen mg/dL 0.0 0.0 - 2.0 mg/dL    Nitrite Urine Negative NEG^Negative    Leukocyte Esterase Urine Negative NEG^Negative    Source Midstream Urine     RBC Urine <1 0 - 2 /HPF    WBC Urine <1 0 - 5 /HPF    Mucous Urine Present (A) NEG^Negative /LPF       Medications   LORazepam (ATIVAN) tablet 0.5 mg (has no administration in time range)   0.9% sodium chloride BOLUS (0 mLs Intravenous Stopped 4/6/20 2333)   iopamidol (ISOVUE-370) solution 100 mL (100 mLs Intravenous Given 4/6/20 2232)   sodium chloride 0.9 % bag 500mL for CT scan flush use (71 mLs Intravenous Given 4/6/20 2232)   sodium chloride 0.9 % 1,000 mL with Infuvite Adult 10 mL, thiamine 100 mg, folic acid 1 mg infusion ( Intravenous Stopped 4/6/20 6991)        Assessments & Plan (with Medical Decision Making)   39 year old male with history of hypertension, hyperlipidemia, GERD, alcohol abuse, and bipolar affective disorder who presents to the emergency department for evaluation of epigastric pain that radiates into the right side and around his back.  Symptoms started 2 weeks ago.  Episodes of nausea and vomiting last week, but none at this time.  Today he reports his pain suddenly became worse at 6:30 PM after he ate a hamburger. Low grade fever here, but denies any chills or known fevers since this started. Patient drinks 750 ml of Vodka daily. Every day smoker. Denies any illicit drug use, but does say he used to use methamphetamine in the past. Patient previously prescribed hydrochlorothiazide but stopped in 2018 after he was hospitalized with JOSHUA and has not restarted any of his prior medications. No history of seizures when stopping alcohol but patient does indicate that he does have difficulty stopping drinking and concern for DTs.    On exam patient is alert and oriented.  Appears distressed, in pain.  Febrile at 100.6.  Elevated blood pressure, last blood pressure reading is 173/108.  No tachycardia.  No hypoxia.  Lung sounds are CTA.  Epigastric, RUQ and LUQ tenderness with palpation.  No leukocytosis.  Elevated hemoglobin and hematocrit.  Hyponatremia with a sodium of 122.  Hypokalemia with potassium 3.3.  Elevated ALT of 281 and AST of 308.  Lipase is elevated at 28,478.  Bilirubin is elevated at 4.6.  Magnesium is low at 1.4.  Abdominal/pelvis CT reveals acute interstitial pancreatitis and dense hepatic steatosis.  No evidence of abscess or focal fluid collection.  Patient has acute alcohol induced pancreatitis.  I discussed the lab and imaging findings with patient.  Recommend admission to the hospital for further management.  Patient was given IV normal saline 1 L bolus.  I spoke with the on-call hospitalist, Dr. Capellan, who will assume care of  "patient on admission.  I initially started patient on a 1 liter NS \"banana bag\" (with MVI, thiamine, and folic acid), but this was stopped due to his hyponatremia. (less than 200ml had infused).  Repeat BMP and phosphorus are pending. Patient is tolerating PO ice chips. Pt has declined any pain medication (states due to his prior addiction history he would like to avoid opioid medication). Pt was given Ativan 0.5 mg PO. Patent will need close monitoring for alcohol withdrawal. Patient is in agreement with the plan.      I have reviewed the nursing notes.    I have reviewed the findings, diagnosis, plan and need for follow up with the patient.    New Prescriptions    No medications on file       Final diagnoses:   Alcohol-induced acute pancreatitis without infection or necrosis   Abdominal pain, epigastric   Hyponatremia   Hypokalemia       4/6/2020   [facility] EMERGENCY DEPARTMENT     AnyiMelanie APRN CNP  04/07/20 0018       Physician Attestation   I, Scott Stover MD, saw and evaluated Sathish Orozco as part of a shared visit.  I have reviewed and discussed with the advanced practice provider their history, physical and plan.    I personally reviewed the vital signs, medications, labs and imaging.    My key history or physical exam findings: 39-year-old male with history of alcohol dependence and abuse with 2 weeks of intermittent upper abdominal pain, nausea and vomiting with increased pain tonight after eating a hamburger.  No signs or symptoms of GI bleeding.  Denies any current alcohol withdrawal symptoms and denies history of alcohol withdrawal seizures or DTs.  Vital signs remarkable for hypertension, tachycardia and fever of 100.6.  Moderate upper abdominal pain greatest in the epigastric and medial right upper quadrant areas.    Key management decisions made by me: I reviewed the diagnostic laboratory evaluation to be performed and the results, CT evaluation, aggressive IV fluids, admit " to the hospitalist service for continued care.  Carefully monitor for symptoms of alcohol withdrawal and treat as needed.    Scott Stover MD  Date of Service (when I saw the patient): 4/6/20     Scott Stover MD  04/07/20 [id number]

## 2020-04-07 NOTE — CONSULTS
Care transitions note:    Provided patient with mental health/chemical dependency resources within discharge instructions.  Care transitions department will remain available with any further discharge needs.      Tammi MARTI RN  Inpatient Care Coordinator  [facility] [phone]  [facility] [phone]

## 2020-04-07 NOTE — PROGRESS NOTES
[facility] TRANSPORT NOTE  Data:   Reason for Transport:  Insulin drip    Bertram Rivas was transported to ICU via cart at 1050.  Patient was accompanied by Registered Nurse. Equipment used for transport: Cardiac monitor . Family was aware of reason for transport: yes, patient able to notify mother.    Action:  Report: given to Savita    Response:  Patient's condition when transferred off unit was stable.    Sangeetha Cheung RN

## 2020-04-07 NOTE — PLAN OF CARE
"Remains on insulin drip, see flow sheet, patient has been resting in bed most of shift with eyes closed, states he \"prefers Toradol instead of Dilaudid\".  Encourage patient to walk around in the room with assistance so not to get weak.    "

## 2020-04-07 NOTE — PROGRESS NOTES
"[facility]      Subjective:  He has continued epigastric pain  But is more comfortable now  Was previously \"on statin for high triglycerides\"--it \"caused kidney problems\"    Review of Systems:  CONSTITUTIONAL:weak  INTEGUMENTARY/SKIN: NEGATIVE for worrisome rashes, moles or lesions  EYES: NEGATIVE for vision changes or irritation  ENT/MOUTH: NEGATIVE for ear, mouth and throat problems  RESP: NEGATIVE for significant cough or SOB  BREAST: NEGATIVE for masses, tenderness or discharge  CV: NEGATIVE for chest pain, palpitations or peripheral edema  GI: abd pain  : NEGATIVE for frequency, dysuria, or hematuria  MUSCULOSKELETAL: NEGATIVE for significant arthralgias or myalgia  NEURO: NEGATIVE for weakness, dizziness or paresthesias  ENDOCRINE: NEGATIVE for temperature intolerance, skin/hair changes  HEME: NEGATIVE for bleeding problems  PSYCHIATRIC: NEGATIVE for changes in mood or affect    Physical Exam:  Vitals Were Reviewed    Patient Vitals for the past 16 hrs:   BP Temp Temp src Pulse Resp SpO2 Height Weight   04/07/20 0736 -- -- -- -- 18 -- -- --   04/07/20 0721 120/65 98.9  F (37.2  C) Oral 75 18 95 % -- --   04/07/20 0609 -- -- -- -- -- -- -- 114.5 kg (252 lb 6.8 oz)   04/07/20 0336 (!) 150/83 100.3  F (37.9  C) Oral 91 18 -- -- --   04/07/20 0243 (!) 170/95 100.3  F (37.9  C) -- 88 -- 95 % -- --   04/07/20 0202 (!) 176/95 -- -- 92 -- -- -- --   04/07/20 0026 (!) 195/111 100  F (37.8  C) Oral 104 18 95 % -- --   04/07/20 0014 -- -- -- -- -- -- -- 114.2 kg (251 lb 12.3 oz)   04/07/20 0000 (!) 181/118 -- -- 96 -- 96 % -- --   04/06/20 2355 -- -- -- -- -- 97 % -- --   04/06/20 2336 (!) 182/119 -- -- 101 -- 96 % -- --   04/06/20 2230 -- -- -- -- -- 95 % -- --   04/06/20 2215 (!) 173/108 -- -- 87 -- 95 % -- --   04/06/20 2200 (!) 183/105 -- -- 84 -- 96 % -- --   04/06/20 2145 (!) 174/104 -- -- 95 -- 98 % -- --   04/06/20 2142 -- -- -- -- -- 96 % -- --   04/06/20 2140 (!) 178/110 -- -- 89 -- -- " "-- --   04/06/20 2113 (!) 200/126 100.6  F (38.1  C) Oral 105 -- 96 % 1.702 m (5' 7\") 113.4 kg (250 lb)         Intake/Output Summary (Last 24 hours) at 4/7/2020 1028  Last data filed at 4/7/2020 0755  Gross per 24 hour   Intake 1160 ml   Output --   Net 1160 ml       GENERAL APPEARANCE: healthy, alert and no distress  EYES: conjunctiva clear, eyes grossly normal  RESP: lungs clear to auscultation - no rales, rhonchi or wheezes  CV: regular rate and rhythm, normal S1 S2, no S3 or S4 and no murmur, click or rub   ABDOMEN: moderate epigastric tenderness and bs are pos, no rebound  MS: no clubbing, cyanosis; no edema  SKIN: clear without significant rashes or lesions    Lab:  Recent Labs   Lab Test 04/07/20  0635 04/07/20  0000   * 126*   POTASSIUM 3.7 3.5   CHLORIDE 100 93*   CO2 22 30   ANIONGAP 10 3   * 308*   BUN 8 17   CR 0.68 <0.14*   DAVID 7.7* 8.1*     CBC RESULTS:   Recent Labs   Lab Test 04/07/20  0635 04/06/20  2138   WBC 10.2 10.7   RBC 3.84* 4.01*   HGB Results not available-specimen lipemic 18.6*   HCT 36.0* 37.3*    211       Results for orders placed or performed during the hospital encounter of 04/06/20 (from the past 24 hour(s))   CBC with platelets, differential   Result Value Ref Range    WBC 10.7 4.0 - 11.0 10e9/L    RBC Count 4.01 (L) 4.4 - 5.9 10e12/L    Hemoglobin 18.6 (H) 13.3 - 17.7 g/dL    Hematocrit 37.3 (L) 40.0 - 53.0 %    MCV 93 78 - 100 fl    MCH 46.4 (H) 26.5 - 33.0 pg    MCHC 49.9 (H) 31.5 - 36.5 g/dL    RDW 13.4 10.0 - 15.0 %    Platelet Count 211 150 - 450 10e9/L    Diff Method Manual Differential     % Neutrophils 75.0 %    % Lymphocytes 18.0 %    % Monocytes 6.0 %    % Eosinophils 1.0 %    % Basophils 0.0 %    Absolute Neutrophil 8.0 1.6 - 8.3 10e9/L    Absolute Lymphocytes 1.9 0.8 - 5.3 10e9/L    Absolute Monocytes 0.6 0.0 - 1.3 10e9/L    Absolute Eosinophils 0.1 0.0 - 0.7 10e9/L    Absolute Basophils 0.0 0.0 - 0.2 10e9/L    Reactive Lymphs Present  "   Comprehensive metabolic panel   Result Value Ref Range    Sodium 122 (L) 133 - 144 mmol/L    Potassium 3.3 (L) 3.4 - 5.3 mmol/L    Chloride 89 (L) 94 - 109 mmol/L    Carbon Dioxide 28 20 - 32 mmol/L    Anion Gap 5 3 - 14 mmol/L    Glucose 344 (H) 70 - 99 mg/dL    Urea Nitrogen 12 7 - 30 mg/dL    Creatinine 0.80 0.66 - 1.25 mg/dL    GFR Estimate >90 >60 mL/min/[1.73_m2]    GFR Estimate If Black >90 >60 mL/min/[1.73_m2]    Calcium 8.1 (L) 8.5 - 10.1 mg/dL    Bilirubin Total 4.6 (H) 0.2 - 1.3 mg/dL    Albumin 2.2 (L) 3.4 - 5.0 g/dL    Protein Total 7.1 6.8 - 8.8 g/dL    Alkaline Phosphatase 200 (H) 40 - 150 U/L     (H) 0 - 70 U/L     (H) 0 - 45 U/L   Lipase   Result Value Ref Range    Lipase 28,478 (H) 73 - 393 U/L   INR   Result Value Ref Range    INR 1.13 0.86 - 1.14   Alcohol level blood   Result Value Ref Range    Ethanol g/dL <0.01 <0.01 g/dL   Magnesium   Result Value Ref Range    Magnesium 1.4 (L) 1.6 - 2.3 mg/dL   Hemoglobin A1c   Result Value Ref Range    Hemoglobin A1C 6.1 (H) 0 - 5.6 %   Phosphorus   Result Value Ref Range    Phosphorus 3.7 2.5 - 4.5 mg/dL   CT Abdomen Pelvis w Contrast    Narrative    EXAM: CT ABDOMEN PELVIS W CONTRAST  LOCATION: [facility]  DATE/TIME: 4/6/2020 10:31 PM    INDICATION: Upper abdominal pain  COMPARISON: None.  TECHNIQUE: CT scan of the abdomen and pelvis was performed following injection of IV contrast. Multiplanar reformats were obtained. Dose reduction techniques were used.  CONTRAST: 100mL Isovue-370    FINDINGS:   LOWER CHEST: Mild fibroatelectasis with no focal infiltrate.    HEPATOBILIARY: Dense hepatic steatosis. Gallbladder unremarkable.    PANCREAS: Diffuse pancreatic and peripancreatic edema. No focal fluid collection.    SPLEEN: Normal.    ADRENAL GLANDS: Normal.    KIDNEYS/BLADDER: Normal.    BOWEL: Normal.    LYMPH NODES: Normal.    VASCULATURE: Unremarkable.    PELVIC ORGANS: Normal.    MUSCULOSKELETAL: Tiny fat-containing  paraumbilical hernia. Degenerative disease. Bilateral spondylolysis with minimal spondylolisthesis of L4 on L5 assuming 5 lumbar type vertebral bodies.      Impression    IMPRESSION:   1.  Evidence for acute interstitial pancreatitis.  2.  Dense hepatic steatosis.     UA reflex to Microscopic   Result Value Ref Range    Color Urine Yellow     Appearance Urine Clear     Glucose Urine Negative NEG^Negative mg/dL    Bilirubin Urine Negative NEG^Negative    Ketones Urine Negative NEG^Negative mg/dL    Specific Gravity Urine 1.030 1.003 - 1.035    Blood Urine Small (A) NEG^Negative    pH Urine 6.0 5.0 - 7.0 pH    Protein Albumin Urine Negative NEG^Negative mg/dL    Urobilinogen mg/dL 0.0 0.0 - 2.0 mg/dL    Nitrite Urine Negative NEG^Negative    Leukocyte Esterase Urine Negative NEG^Negative    Source Midstream Urine     RBC Urine <1 0 - 2 /HPF    WBC Urine <1 0 - 5 /HPF    Mucous Urine Present (A) NEG^Negative /LPF   Basic metabolic panel   Result Value Ref Range    Sodium 126 (L) 133 - 144 mmol/L    Potassium 3.5 3.4 - 5.3 mmol/L    Chloride 93 (L) 94 - 109 mmol/L    Carbon Dioxide 30 20 - 32 mmol/L    Anion Gap 3 3 - 14 mmol/L    Glucose 308 (H) 70 - 99 mg/dL    Urea Nitrogen 17 7 - 30 mg/dL    Creatinine <0.14 (L) 0.66 - 1.25 mg/dL    GFR Estimate >90 >60 mL/min/[1.73_m2]    GFR Estimate If Black >90 >60 mL/min/[1.73_m2]    Calcium 8.1 (L) 8.5 - 10.1 mg/dL   Glucose by meter   Result Value Ref Range    Glucose 132 (H) 70 - 99 mg/dL   Glucose by meter   Result Value Ref Range    Glucose 136 (H) 70 - 99 mg/dL   CBC with platelets   Result Value Ref Range    WBC 10.2 4.0 - 11.0 10e9/L    RBC Count 3.84 (L) 4.4 - 5.9 10e12/L    Hemoglobin Results not available-specimen lipemic 13.3 - 17.7 g/dL    Hematocrit 36.0 (L) 40.0 - 53.0 %    MCV 94 78 - 100 fl    MCH Results not available-specimen lipemic 26.5 - 33.0 pg    MCHC Results not available-specimen lipemic 31.5 - 36.5 g/dL    RDW 13.5 10.0 - 15.0 %    Platelet Count 206  "150 - 450 10e9/L   Basic metabolic panel   Result Value Ref Range    Sodium 132 (L) 133 - 144 mmol/L    Potassium 3.7 3.4 - 5.3 mmol/L    Chloride 100 94 - 109 mmol/L    Carbon Dioxide 22 20 - 32 mmol/L    Anion Gap 10 3 - 14 mmol/L    Glucose 153 (H) 70 - 99 mg/dL    Urea Nitrogen 8 7 - 30 mg/dL    Creatinine 0.68 0.66 - 1.25 mg/dL    GFR Estimate >90 >60 mL/min/[1.73_m2]    GFR Estimate If Black >90 >60 mL/min/[1.73_m2]    Calcium 7.7 (L) 8.5 - 10.1 mg/dL   Hepatic panel   Result Value Ref Range    Bilirubin Direct 1.7 (H) 0.0 - 0.2 mg/dL    Bilirubin Total 2.6 (H) 0.2 - 1.3 mg/dL    Albumin 2.6 (L) 3.4 - 5.0 g/dL    Protein Total 6.7 (L) 6.8 - 8.8 g/dL    Alkaline Phosphatase 170 (H) 40 - 150 U/L     (H) 0 - 70 U/L     (H) 0 - 45 U/L   INR   Result Value Ref Range    INR 1.15 (H) 0.86 - 1.14   Lipase   Result Value Ref Range    Lipase 13,844 (H) 73 - 393 U/L   Magnesium   Result Value Ref Range    Magnesium 2.1 1.6 - 2.3 mg/dL   Phosphorus   Result Value Ref Range    Phosphorus 2.6 2.5 - 4.5 mg/dL   Lipid Profile   Result Value Ref Range    Cholesterol 311 (H) <200 mg/dL    Triglycerides 2,252 (H) <150 mg/dL    HDL Cholesterol 19 (L) >39 mg/dL    LDL Cholesterol Calculated  <100 mg/dL     Cannot estimate LDL when triglyceride exceeds 400 mg/dL    Non HDL Cholesterol 292 (H) <130 mg/dL   Glucose by meter   Result Value Ref Range    Glucose 144 (H) 70 - 99 mg/dL       Assessment and Plan:    See Mae SNYDER's assessment    Will transfer to icu for insulin gtt and D5 due to high triglycerides.    Continue to use D5 for now due to sodium over correction.    Probably start fibrate in future--need to investigate history of \"statin problems\"    Continue q 6 sodium.    Follow creat closely--low but up from initial.    "

## 2020-04-07 NOTE — ED NOTES
RUQ pain for the last 2 weeks, a couple episodes of vomiting in that time, was worse today after eating a burger. No diarrhea, no symptoms of fever until he got here. Pt does not have a cough or shortness of breath. Feels like his stomach is hard. Also having pain in right upper back pain. Notes dark urine but not burning or frequency

## 2020-04-07 NOTE — PROGRESS NOTES
[facility] [facility] TRANSPORT NOTE  Data:   Reason for Transport:  Transfer to ICU    Bertram Rivas was transported to [facility] via cart at 1100.  Patient was accompanied by Registered Nurse and Nursing Assistant. Equipment used for transport: Cardiac monitor , Pulse oximeter and Blood pressure monitor. Family was aware of reason for transport: yes    Action:  Report: received from JOSE Vivas    Response:  Patient's condition upon return was guarded.

## 2020-04-07 NOTE — PLAN OF CARE
Pt voided 25mls maryan ,clear urine but felt lower abd  fullness. Bladder scanned for only 12mls. Pt given toradol for pain , he wanted to take the dilaudid later.

## 2020-04-07 NOTE — PLAN OF CARE
Patient arrives to the floor calm, A&Ox4, and accepting of hospital admission. He states he is having abdominal pain, but is refusing narcotics or any pain medications at this time. He did receive ativan in the ER, but he says it has given little relief. CIWA has been 0 all night. Patient has not fallen asleep as he states he has a lot of anxiety baseline and just lays in bed and thinks. He does state he drinks alcohol to help him sleep. He is pleasant upon rounding. The pain, he states, is bearable when he is just laying in bed not doing anything. Fluids infusing into IV with no complications. Patient received IV magnesium replacement. Blood sugars have been around 130 with no coverage needed. Patient is maintaining diet orders. Patient has no further questions at this time.

## 2020-04-07 NOTE — PROGRESS NOTES
[place] [facility] [place]    Patient admitted to room 2404 at approximately 0030 via wheelchair from emergency room. Patient was accompanied by transport tech.     Verbal SBAR report received from Risa prior to patient arrival.     Patient ambulated to bed independently. Patient alert and oriented X 3. Patient has pain, but denies narcotic use. Wants to try and \"wait it out\" 0-10 Pain Scale: 7. Admission vital signs: Blood pressure (!) 150/83, pulse 91, temperature 100.3  F (37.9  C), temperature source Oral, resp. rate 18, height 1.702 m (5' 7\"), weight 113.4 kg (250 lb), SpO2 95 %. Patient was oriented to plan of care, call light, bed controls, tv, telephone, bathroom and visiting hours.     Risk Assessment    The following safety risks were identified during admission: None at this time, but could be fall risk due to patient admitted to drinking 750ml of vodka a day and his last drink was yesterday. Yellow risk band applied: YES.     Skin Initial Assessment    This writer admitted this patient and completed a full skin assessment and Marshall score in the Adult PCS flowsheet. Appropriate interventions initiated as needed.     Secondary skin check completed by Patient refused. Patient denies any skin issues and states he will let us know if he feels any irritation or soreness.     Marshall Risk Assessment  Sensory Perception: 4-->no impairment  Moisture: 4-->rarely moist  Activity: 3-->walks occasionally  Mobility: 3-->slightly limited  Nutrition: 3-->adequate  Friction and Shear: 3-->no apparent problem  Marshall Score: 20  Bed Support Surface: Atmos Air mattress    Education    Patient has a [place] to Observation order: No  Observation education completed and documented: No      Hector Cedillo RN    "

## 2020-04-07 NOTE — H&P
[facility]    History and Physical - Hospitalist Service       Date of Admission:  4/6/2020    Assessment & Plan   Bertram Rivas is a 39 year old male admitted on 4/6/2020. He has history of alcohol use, hypertension, hyperlipidemia, GERD and YULISSA. He presents with epigastric pain.    Acute Pancreatitis  2 weeks of intermittent abdominal discomfort which worsened acutely day prior to admission with severe sharp, cramping epigastric pain. No nausea, emesis or diarrhea. No prior episodes. Daily alcohol drinker. CT abdomen/pelvis shows evidence for interstitial pancreatitis. [place] of [address], elevated LFTs (Alk phos 200, , , bilirubin 4.6), low Na/Mg/K. Vitals show fever 100.6, hypertension. Appears to be acute pancreatitis, high suspicion for alcohol related given longstanding use. Triglycerides returned at 2252, may be contributing to acute pancreatitis and elevation likely related to alcohol use. Normal appearing gallbladder on CT, low suspicion at present for gallbladder induced pancreatitis with above findings and reassuring imaging.   - NPO initially  - IVF: 150 ml/hr D5W + 20 K   - insulin drip for hypertriglyeridemia titrate based on BS  - Nausea protocol  - Pain: trial of IV Toradol prn, would like to avoid narcotics given history of meth use will leave IV dilaudid prn and  - IV famotidine and pantoprazole started to help with pain  - daily CBC, CMP, triglycerides    Hyponatremia  Na 122 --> 126 --> 132, received 1 L NS in ED and then 0.45 NS infusion overnight. Likely chronic related to poor intake and alcohol use. Given his hyponatremia is likely chronic will monitor and treat for overcorrection.  - 600 ml bolus of D5W and 2 mcg DDVAP Q 6 hours x 24 hours for overcorrection  - fluids as above, will switch to 0.45 NS + D5W if sodium dropping  - monitor sodium Q6 hours today  - Sodium correction goal of 128 by 2200, max correction of 130  today    Elevated LFTs, likely alcohol hepatitis  Alcoholic Liver Disease  CT shows dense fatty liver, likely related to long-standing ETOH use. Admission labs show: , , Alk Phos 200, total bili 4.6, INR 1.15. Improving to: , , Alk Phos 170, bilirubin 2.6.  - daily hepatic panel  - encourage alcohol cessation  - care transitions consult for chemical dependency    Hypokalemia  Hypomagnesia  K 3.3 initially, Mg 1.4. Suspect related to poor intake and alcohol use.  - repletion protocols in place    Alcohol Dependence  Reports drinking 750 mL of vodka daily. Last drink ~30 hours prior to admission. Ethanol not detected on presentation. No history of prior alcohol withdrawal reported. On admission, no clear signs of withdrawal.   - CIWA protocol in place with PRN ativan  - thiamine, folic acid, multivitamin therapy initiated  - monitor CBC, electrolytes, CMP  - care transitions consult for chemical dependency    Hypertension, uncontrolled  Previously diagnosed. Not currently on medications for this. Blood pressures reviewed, elevated since arrival.   - IV labetol prn SBP > 180 or DP > 115    Pre-Diabetes  No prior diagnosis.  Hemoglobin A1c 6.1. Blood sugars initially elevated.   - insulin drip initiated as above    Hyperlipidemia  Hypertriglyceridemia  Previously diagnosed. Not currently on medications for this. Total cholesterol 311, unable to calculate LDL due to hypertriglyceridemia. Previously was on high dose atorvastatin however discontinued in the setting of rhabdomyolysis.   - will plan to start oral medications when tolerating orals    Gastroesophageal reflux disease  Previously diagnosed. Not currently on medications for this.    Bipolar Disorder  Previous diagnosis, has not been on medications since 2010 and doing well.    YULISSA  Previously diagnosed. Not currently using machine, was not set up correctly.     Diet: NPO for Medical/Clinical Reasons Except for: Meds, Ice Chips    DVT  "Prophylaxis: Enoxaparin (Lovenox) SQ  Silverman Catheter: not present  Code Status: Full code    Disposition Plan   Expected discharge: 3-4 days, recommended to prior living arrangement once pain resolving, labs normalizing and tolerating adequate oral intake.  Entered: Megan Marsh PA-C 04/07/2020, 6:53 AM     The patient's care was discussed with the Attending Physician, Dr. Daniel Hughes and Patient.    Megan Marsh PA-C  [facility]  ______________________________________________________________________    Chief Complaint   Epigastric pain    History is obtained from the patient    History of Present Illness   Bertram Rivas is a 39 year old male who presents with epigastric pain.    For the past 2 weeks he has been having issues with his stomach. He states his schedule has been off due to the COVID-19, which means he is not eating regularly or working. He feels he has been waiting too long to eat which causes him to get a little nauseous before eating as well as a discomfort in his stomach \"like a hunger pain\". After eating, the discomfort would persist for a little and then resolve. 3 days ago he has actually was feeling better and a lot of these symptoms had resolved.     Then yesterday he started to feel an achiness in his stomach which felt somewhat similar to his previous episodes. He was eating small things spaced out and did not have any pain with this. Then later in the day he took a large drink of water and had a severe pain in his epigastric region. He tried to eat a hamburger which again worsened the pain. He continues to have intermittent cramping pains since that time. If he is still it seems to subside a bit but still intermittently cramps. He does not have any nausea or emesis but has felt some radiation up to his chest/throat which feels like severe indigestion. He has not had any episodes like this before. He does drink 750 ml of vodka daily.     He has felt " a little clammy and his temperature has been elevated upon presentation. He denies chills, myalgias, cough, congestion, sore throat, shortness of breath, chest pains, changes in urination. No sick contacts.    Review of Systems    The 10 point Review of Systems is negative other than noted in the HPI or here.     Past Medical History    I have reviewed this patient's medical history and updated it with pertinent information if needed.   Past Medical History:   Diagnosis Date     Bipolar affective disorder (H) 4/6/2020     GERD (gastroesophageal reflux disease) 10/8/2010     Hyperlipidemia 4/6/2020     Hypertension 4/6/2020     YULISSA (obstructive sleep apnea) 4/6/2020     Past Surgical History   I have reviewed this patient's surgical history and updated it with pertinent information if needed.  Past Surgical History:   Procedure Laterality Date     HERNIA REPAIR       Social History   I have reviewed this patient's social history and updated it with pertinent information if needed.  His lives in [place], his mother lives with him.   Social History     Tobacco Use     Smoking status: Current Every Day Smoker     Years: 25.00     Smokeless tobacco: Never Used     Tobacco comment: 10 cigarettes/day   Substance Use Topics     Alcohol use: Yes     Comment: 750 mL daily of vodka     Drug use: Not Currently     Types: Methamphetamines     Comment: prior meth addict, prefers to avoid narcotics     Family History   I have reviewed this patient's family history and updated it with pertinent information if needed.   Family History   Problem Relation Age of Onset     Chronic Bronchitis Mother      Heart Failure Mother      Depression Mother      Kidney Disease Father      Chronic Bronchitis Father      Venous thrombosis Father      Prior to Admission Medications   Prior to Admission Medications   Prescriptions Last Dose Informant Patient Reported? Taking?   VITAMIN D, CHOLECALCIFEROL, PO 4/7/2020 at Unknown time  Yes Yes   Sig:  Take by mouth daily      Facility-Administered Medications: None     Allergies   No Known Allergies    Physical Exam   Vital Signs: Temp: 100.3  F (37.9  C) Temp src: Oral BP: (!) 150/83 Pulse: 91   Resp: 18 SpO2: 95 % O2 Device: None (Room air)    Weight: 252 lbs 6.83 oz    Constitutional: laying down in bed, intermittent grimacing due to sharp pains, awake, alert, cooperative, no apparent distress, and appears stated age.  Eyes: Lids and lashes normal, extra ocular muscles intact, sclera clear, conjunctiva normal  ENT: Normocephalic, without obvious abnormality, atraumatic, sinuses nontender on palpation, oral pharynx with moist mucous membranes.  Hematologic / Lymphatic: no cervical lymphadenopathy and no supraclavicular lymphadenopathy  Respiratory: good air exchange, clear to auscultation bilaterally, no crackles or wheezing  Cardiovascular: regular rate and rhythm, and no murmur noted. No lower extremity edema.  GI: normal bowel sounds, soft, non-distended, tenderness to palpation in across the RUQ, epigastrium and LUQ with voluntary guarding no rebound.  Genitounirinary: deferred  Skin: normal skin color, texture, turgor  Musculoskeletal: normal bulk and tone. Moves all 4 extremities appropriately.  Neurologic: Awake, alert, oriented to name, place and time.  Cranial nerves II-XII are grossly intact.  Neuropsychiatric: calm, pleasant, cooperative, appropriate thought process/content, short term memory intact.    Data   Data reviewed today: I reviewed all medications, new labs and imaging results over the last 24 hours. I personally reviewed no images or EKG's today.    Recent Labs   Lab 04/07/20  0000 04/06/20  2138   WBC  --  10.7   HGB  --  18.6*   MCV  --  93   PLT  --  211   INR  --  1.13   * 122*   POTASSIUM 3.5 3.3*   CHLORIDE 93* 89*   CO2 30 28   BUN 17 12   CR <0.14* 0.80   ANIONGAP 3 5   DAVID 8.1* 8.1*   * 344*   ALBUMIN  --  2.2*   PROTTOTAL  --  7.1   BILITOTAL  --  4.6*   ALKPHOS  --   200*   ALT  --  281*   AST  --  308*   LIPASE  --  28,478*     Recent Results (from the past 24 hour(s))   CT Abdomen Pelvis w Contrast    Narrative    EXAM: CT ABDOMEN PELVIS W CONTRAST  LOCATION: [facility]  DATE/TIME: 4/6/2020 10:31 PM    INDICATION: Upper abdominal pain  COMPARISON: None.  TECHNIQUE: CT scan of the abdomen and pelvis was performed following injection of IV contrast. Multiplanar reformats were obtained. Dose reduction techniques were used.  CONTRAST: 100mL Isovue-370    FINDINGS:   LOWER CHEST: Mild fibroatelectasis with no focal infiltrate.    HEPATOBILIARY: Dense hepatic steatosis. Gallbladder unremarkable.    PANCREAS: Diffuse pancreatic and peripancreatic edema. No focal fluid collection.    SPLEEN: Normal.    ADRENAL GLANDS: Normal.    KIDNEYS/BLADDER: Normal.    BOWEL: Normal.    LYMPH NODES: Normal.    VASCULATURE: Unremarkable.    PELVIC ORGANS: Normal.    MUSCULOSKELETAL: Tiny fat-containing paraumbilical hernia. Degenerative disease. Bilateral spondylolysis with minimal spondylolisthesis of L4 on L5 assuming 5 lumbar type vertebral bodies.      Impression    IMPRESSION:   1.  Evidence for acute interstitial pancreatitis.  2.  Dense hepatic steatosis.

## 2020-04-07 NOTE — ED NOTES
"Pt reports his abdomen is uncomfortable, though states he doesn't want \"pain killers, I'm and ex-addict\". Pt asking about ativan or \"something to help me sleep\". Will update MD.     Of note, patient reports daily alcohol use, states last drink 4/6 at 2200. Pt CIWA 4. Pt denies prior hx of alcohol withdrawal, and denies hx of seizures.   "

## 2020-04-08 LAB
ALBUMIN SERPL-MCNC: 2.2 G/DL (ref 3.4–5)
ALP SERPL-CCNC: 115 U/L (ref 40–150)
ALT SERPL W P-5'-P-CCNC: 77 U/L (ref 0–70)
ANION GAP SERPL CALCULATED.3IONS-SCNC: 10 MMOL/L (ref 3–14)
AST SERPL W P-5'-P-CCNC: 118 U/L (ref 0–45)
BILIRUB DIRECT SERPL-MCNC: 1.5 MG/DL (ref 0–0.2)
BILIRUB SERPL-MCNC: 2.7 MG/DL (ref 0.2–1.3)
BUN SERPL-MCNC: 7 MG/DL (ref 7–30)
CALCIUM SERPL-MCNC: 6.6 MG/DL (ref 8.5–10.1)
CHLORIDE SERPL-SCNC: 100 MMOL/L (ref 94–109)
CO2 SERPL-SCNC: 20 MMOL/L (ref 20–32)
CREAT SERPL-MCNC: 0.81 MG/DL (ref 0.66–1.25)
ERYTHROCYTE [DISTWIDTH] IN BLOOD BY AUTOMATED COUNT: 14 % (ref 10–15)
GFR SERPL CREATININE-BSD FRML MDRD: >90 ML/MIN/{1.73_M2}
GLUCOSE BLDC GLUCOMTR-MCNC: 133 MG/DL (ref 70–99)
GLUCOSE BLDC GLUCOMTR-MCNC: 138 MG/DL (ref 70–99)
GLUCOSE BLDC GLUCOMTR-MCNC: 141 MG/DL (ref 70–99)
GLUCOSE BLDC GLUCOMTR-MCNC: 141 MG/DL (ref 70–99)
GLUCOSE BLDC GLUCOMTR-MCNC: 149 MG/DL (ref 70–99)
GLUCOSE BLDC GLUCOMTR-MCNC: 149 MG/DL (ref 70–99)
GLUCOSE BLDC GLUCOMTR-MCNC: 150 MG/DL (ref 70–99)
GLUCOSE BLDC GLUCOMTR-MCNC: 153 MG/DL (ref 70–99)
GLUCOSE BLDC GLUCOMTR-MCNC: 153 MG/DL (ref 70–99)
GLUCOSE BLDC GLUCOMTR-MCNC: 156 MG/DL (ref 70–99)
GLUCOSE BLDC GLUCOMTR-MCNC: 162 MG/DL (ref 70–99)
GLUCOSE BLDC GLUCOMTR-MCNC: 162 MG/DL (ref 70–99)
GLUCOSE SERPL-MCNC: 157 MG/DL (ref 70–99)
HCT VFR BLD AUTO: 33 % (ref 40–53)
HGB BLD-MCNC: 12.1 G/DL (ref 13.3–17.7)
LACTATE BLD-SCNC: 0.6 MMOL/L (ref 0.7–2)
LIPASE SERPL-CCNC: 4185 U/L (ref 73–393)
MAGNESIUM SERPL-MCNC: 1.7 MG/DL (ref 1.6–2.3)
MCH RBC QN AUTO: 34.9 PG (ref 26.5–33)
MCHC RBC AUTO-ENTMCNC: 36.7 G/DL (ref 31.5–36.5)
MCV RBC AUTO: 95 FL (ref 78–100)
PHOSPHATE SERPL-MCNC: 2 MG/DL (ref 2.5–4.5)
PLATELET # BLD AUTO: 189 10E9/L (ref 150–450)
POTASSIUM SERPL-SCNC: 3.9 MMOL/L (ref 3.4–5.3)
PROT SERPL-MCNC: 6.4 G/DL (ref 6.8–8.8)
RBC # BLD AUTO: 3.47 10E12/L (ref 4.4–5.9)
SODIUM SERPL-SCNC: 129 MMOL/L (ref 133–144)
SODIUM SERPL-SCNC: 130 MMOL/L (ref 133–144)
TRIGL SERPL-MCNC: 1426 MG/DL
WBC # BLD AUTO: 13.4 10E9/L (ref 4–11)

## 2020-04-08 PROCEDURE — 25000125 ZZHC RX 250: Performed by: FAMILY MEDICINE

## 2020-04-08 PROCEDURE — 25000125 ZZHC RX 250: Performed by: PHYSICIAN ASSISTANT

## 2020-04-08 PROCEDURE — 00000146 ZZHCL STATISTIC GLUCOSE BY METER IP

## 2020-04-08 PROCEDURE — 80076 HEPATIC FUNCTION PANEL: CPT | Performed by: FAMILY MEDICINE

## 2020-04-08 PROCEDURE — 84100 ASSAY OF PHOSPHORUS: CPT | Performed by: FAMILY MEDICINE

## 2020-04-08 PROCEDURE — C9113 INJ PANTOPRAZOLE SODIUM, VIA: HCPCS | Performed by: PHYSICIAN ASSISTANT

## 2020-04-08 PROCEDURE — 25000132 ZZH RX MED GY IP 250 OP 250 PS 637: Performed by: PHYSICIAN ASSISTANT

## 2020-04-08 PROCEDURE — 25000132 ZZH RX MED GY IP 250 OP 250 PS 637: Performed by: FAMILY MEDICINE

## 2020-04-08 PROCEDURE — 80048 BASIC METABOLIC PNL TOTAL CA: CPT | Performed by: FAMILY MEDICINE

## 2020-04-08 PROCEDURE — 84478 ASSAY OF TRIGLYCERIDES: CPT | Performed by: FAMILY MEDICINE

## 2020-04-08 PROCEDURE — 25800030 ZZH RX IP 258 OP 636: Performed by: FAMILY MEDICINE

## 2020-04-08 PROCEDURE — 85027 COMPLETE CBC AUTOMATED: CPT | Performed by: FAMILY MEDICINE

## 2020-04-08 PROCEDURE — 25000128 H RX IP 250 OP 636: Performed by: PHYSICIAN ASSISTANT

## 2020-04-08 PROCEDURE — 25800030 ZZH RX IP 258 OP 636: Performed by: PHYSICIAN ASSISTANT

## 2020-04-08 PROCEDURE — 83690 ASSAY OF LIPASE: CPT | Performed by: FAMILY MEDICINE

## 2020-04-08 PROCEDURE — 36415 COLL VENOUS BLD VENIPUNCTURE: CPT | Performed by: FAMILY MEDICINE

## 2020-04-08 PROCEDURE — 36415 COLL VENOUS BLD VENIPUNCTURE: CPT | Performed by: PHYSICIAN ASSISTANT

## 2020-04-08 PROCEDURE — 83605 ASSAY OF LACTIC ACID: CPT | Performed by: FAMILY MEDICINE

## 2020-04-08 PROCEDURE — 99233 SBSQ HOSP IP/OBS HIGH 50: CPT | Performed by: FAMILY MEDICINE

## 2020-04-08 PROCEDURE — 25000131 ZZH RX MED GY IP 250 OP 636 PS 637: Performed by: PHYSICIAN ASSISTANT

## 2020-04-08 PROCEDURE — 83735 ASSAY OF MAGNESIUM: CPT | Performed by: FAMILY MEDICINE

## 2020-04-08 PROCEDURE — 84295 ASSAY OF SERUM SODIUM: CPT | Performed by: PHYSICIAN ASSISTANT

## 2020-04-08 PROCEDURE — 25000128 H RX IP 250 OP 636: Performed by: FAMILY MEDICINE

## 2020-04-08 PROCEDURE — 20000003 ZZH R&B ICU

## 2020-04-08 RX ORDER — DESMOPRESSIN ACETATE 4 UG/ML
2 INJECTION, SOLUTION INTRAVENOUS; SUBCUTANEOUS ONCE
Status: DISCONTINUED | OUTPATIENT
Start: 2020-04-08 | End: 2020-04-08

## 2020-04-08 RX ORDER — FENOFIBRATE 160 MG/1
160 TABLET ORAL DAILY
Status: DISCONTINUED | OUTPATIENT
Start: 2020-04-08 | End: 2020-04-10 | Stop reason: HOSPADM

## 2020-04-08 RX ADMIN — MULTIPLE VITAMINS W/ MINERALS TAB 1 TABLET: TAB at 08:27

## 2020-04-08 RX ADMIN — FAMOTIDINE 20 MG: 20 INJECTION, SOLUTION INTRAVENOUS at 08:26

## 2020-04-08 RX ADMIN — FOLIC ACID 1 MG: 1 TABLET ORAL at 08:27

## 2020-04-08 RX ADMIN — HYDROMORPHONE HYDROCHLORIDE 0.2 MG: 1 INJECTION, SOLUTION INTRAMUSCULAR; INTRAVENOUS; SUBCUTANEOUS at 22:15

## 2020-04-08 RX ADMIN — KETOROLAC TROMETHAMINE 15 MG: 15 INJECTION, SOLUTION INTRAMUSCULAR; INTRAVENOUS at 00:12

## 2020-04-08 RX ADMIN — KETOROLAC TROMETHAMINE 15 MG: 15 INJECTION, SOLUTION INTRAMUSCULAR; INTRAVENOUS at 17:31

## 2020-04-08 RX ADMIN — DESMOPRESSIN ACETATE 2 MCG: 4 SOLUTION INTRAVENOUS at 01:28

## 2020-04-08 RX ADMIN — Medication 1 MG: at 00:09

## 2020-04-08 RX ADMIN — POTASSIUM CHLORIDE: 2 INJECTION, SOLUTION, CONCENTRATE INTRAVENOUS at 12:06

## 2020-04-08 RX ADMIN — HYDROMORPHONE HYDROCHLORIDE 0.2 MG: 1 INJECTION, SOLUTION INTRAMUSCULAR; INTRAVENOUS; SUBCUTANEOUS at 03:23

## 2020-04-08 RX ADMIN — POTASSIUM CHLORIDE: 2 INJECTION, SOLUTION, CONCENTRATE INTRAVENOUS at 04:48

## 2020-04-08 RX ADMIN — FAMOTIDINE 20 MG: 20 INJECTION, SOLUTION INTRAVENOUS at 19:44

## 2020-04-08 RX ADMIN — THIAMINE HCL TAB 100 MG 100 MG: 100 TAB at 08:27

## 2020-04-08 RX ADMIN — PANTOPRAZOLE SODIUM 40 MG: 40 INJECTION, POWDER, LYOPHILIZED, FOR SOLUTION INTRAVENOUS at 08:26

## 2020-04-08 RX ADMIN — ENOXAPARIN SODIUM 40 MG: 40 INJECTION SUBCUTANEOUS at 00:10

## 2020-04-08 RX ADMIN — HYDROMORPHONE HYDROCHLORIDE 0.2 MG: 1 INJECTION, SOLUTION INTRAMUSCULAR; INTRAVENOUS; SUBCUTANEOUS at 08:34

## 2020-04-08 RX ADMIN — POTASSIUM CHLORIDE: 2 INJECTION, SOLUTION, CONCENTRATE INTRAVENOUS at 19:44

## 2020-04-08 RX ADMIN — POTASSIUM PHOSPHATE, MONOBASIC AND POTASSIUM PHOSPHATE, DIBASIC 15 MMOL: 224; 236 INJECTION, SOLUTION INTRAVENOUS at 09:08

## 2020-04-08 RX ADMIN — FENOFIBRATE 160 MG: 160 TABLET, FILM COATED ORAL at 09:09

## 2020-04-08 ASSESSMENT — ACTIVITIES OF DAILY LIVING (ADL)
ADLS_ACUITY_SCORE: 10

## 2020-04-08 ASSESSMENT — MIFFLIN-ST. JEOR: SCORE: 2042.63

## 2020-04-08 NOTE — PROGRESS NOTES
Pt alert and oriented. Remains on insulin drip, BG stable through night and remains on algorithm 1. Oxygenating well on RA. PRN Dilaudid and Toradol given with some relief. Minimal urine output.     Yolanda Larios RN

## 2020-04-08 NOTE — PROGRESS NOTES
[facility] Hospitalist Service      Subjective:  Pain persists  Hard to sleep last night with insomnia and pain  Has been thru cd rx five times  Plans on staying off etoh  No hungry  Had stool and flatus this am    Review of Systems:  CONSTITUTIONAL: NEGATIVE for fever, chills, change in weight  INTEGUMENTARY/SKIN: NEGATIVE for worrisome rashes, moles or lesions  EYES: NEGATIVE for vision changes or irritation  ENT/MOUTH: NEGATIVE for ear, mouth and throat problems  RESP: NEGATIVE for significant cough or SOB  BREAST: NEGATIVE for masses, tenderness or discharge  CV: NEGATIVE for chest pain, palpitations or peripheral edema  GI: above  : NEGATIVE for frequency, dysuria, or hematuria  MUSCULOSKELETAL: NEGATIVE for significant arthralgias or myalgia  NEURO: NEGATIVE for weakness, dizziness or paresthesias  ENDOCRINE: NEGATIVE for temperature intolerance, skin/hair changes  HEME: NEGATIVE for bleeding problems  PSYCHIATRIC: NEGATIVE for changes in mood or affect    Physical Exam:  Vitals Were Reviewed    Patient Vitals for the past 16 hrs:   BP Temp Temp src Pulse Heart Rate Resp SpO2 Weight   04/08/20 0700 134/82 -- -- 96 96 14 95 % --   04/08/20 0600 (!) 153/89 -- -- 101 114 20 96 % --   04/08/20 0500 137/83 -- -- 92 96 24 94 % --   04/08/20 0400 (!) 145/96 98.9  F (37.2  C) Oral 92 93 20 94 % 116.4 kg (256 lb 9.9 oz)   04/08/20 0300 139/88 -- -- 89 89 14 95 % --   04/08/20 0200 (!) 141/87 -- -- 94 93 21 95 % --   04/08/20 0100 132/89 -- -- 92 90 8 97 % --   04/08/20 0000 139/85 98.7  F (37.1  C) Oral 95 93 29 94 % --   04/07/20 2300 137/73 -- -- 95 93 18 94 % --   04/07/20 2200 115/73 -- -- 93 90 15 95 % --   04/07/20 2100 134/83 -- -- 92 94 14 95 % --   04/07/20 2000 128/80 99  F (37.2  C) Oral 87 89 20 95 % --   04/07/20 1900 133/77 97.5  F (36.4  C) Oral 89 90 22 95 % --   04/07/20 1700 128/82 -- -- 89 93 16 94 % --   04/07/20 1615 -- -- -- -- -- -- 94 % --         Intake/Output Summary (Last 24 hours) at  4/8/2020 0808  Last data filed at 4/8/2020 0606  Gross per 24 hour   Intake 2351.5 ml   Output 450 ml   Net 1901.5 ml       GENERAL APPEARANCE: healthy, alert and no distress  EYES: conjunctiva clear, eyes grossly normal  RESP: lungs clear to auscultation - no rales, rhonchi or wheezes  CV: regular rate and rhythm, normal S1 S2, no S3 or S4 and no murmur, click or rub   ABDOMEN: moderate epigastic tenderness, but no rebound, guarding. bs loud  MS: no clubbing, cyanosis; no edema  SKIN: clear without significant rashes or lesions    Lab:  Recent Labs   Lab Test 04/08/20  0555 04/08/20  0003 04/07/20  1804  04/07/20  0635   * 129* 130*   < > 132*   POTASSIUM 3.9  --  3.9  --  3.7   CHLORIDE 100  --   --   --  100   CO2 20  --   --   --  22   ANIONGAP 10  --   --   --  10   *  --   --   --  153*   BUN 7  --   --   --  8   CR 0.81  --   --   --  0.68   DAVID 6.6*  --   --   --  7.7*    < > = values in this interval not displayed.     CBC RESULTS:   Recent Labs   Lab Test 04/08/20 0555 04/07/20  0635   WBC 13.4* 10.2   RBC 3.47* 3.84*   HGB 12.1* Results not available-specimen lipemic   HCT 33.0* 36.0*    206       Results for orders placed or performed during the hospital encounter of 04/06/20 (from the past 24 hour(s))   Glucose by meter   Result Value Ref Range    Glucose 201 (H) 70 - 99 mg/dL   Sodium   Result Value Ref Range    Sodium 125 (L) 133 - 144 mmol/L   Glucose by meter   Result Value Ref Range    Glucose 151 (H) 70 - 99 mg/dL   Glucose by meter   Result Value Ref Range    Glucose 148 (H) 70 - 99 mg/dL   Glucose by meter   Result Value Ref Range    Glucose 150 (H) 70 - 99 mg/dL   Glucose by meter   Result Value Ref Range    Glucose 164 (H) 70 - 99 mg/dL   Glucose by meter   Result Value Ref Range    Glucose 175 (H) 70 - 99 mg/dL   Glucose by meter   Result Value Ref Range    Glucose 164 (H) 70 - 99 mg/dL   Sodium   Result Value Ref Range    Sodium 130 (L) 133 - 144 mmol/L   Potassium    Result Value Ref Range    Potassium 3.9 3.4 - 5.3 mmol/L   Glucose by meter   Result Value Ref Range    Glucose 166 (H) 70 - 99 mg/dL   Glucose by meter   Result Value Ref Range    Glucose 156 (H) 70 - 99 mg/dL   Glucose by meter   Result Value Ref Range    Glucose 151 (H) 70 - 99 mg/dL   Glucose by meter   Result Value Ref Range    Glucose 137 (H) 70 - 99 mg/dL   Glucose by meter   Result Value Ref Range    Glucose 156 (H) 70 - 99 mg/dL   Sodium   Result Value Ref Range    Sodium 129 (L) 133 - 144 mmol/L   Glucose by meter   Result Value Ref Range    Glucose 138 (H) 70 - 99 mg/dL   Glucose by meter   Result Value Ref Range    Glucose 141 (H) 70 - 99 mg/dL   Glucose by meter   Result Value Ref Range    Glucose 153 (H) 70 - 99 mg/dL   Triglycerides   Result Value Ref Range    Triglycerides 1,426 (H) <150 mg/dL   Basic metabolic panel   Result Value Ref Range    Sodium 130 (L) 133 - 144 mmol/L    Potassium 3.9 3.4 - 5.3 mmol/L    Chloride 100 94 - 109 mmol/L    Carbon Dioxide 20 20 - 32 mmol/L    Anion Gap 10 3 - 14 mmol/L    Glucose 157 (H) 70 - 99 mg/dL    Urea Nitrogen 7 7 - 30 mg/dL    Creatinine 0.81 0.66 - 1.25 mg/dL    GFR Estimate >90 >60 mL/min/[1.73_m2]    GFR Estimate If Black >90 >60 mL/min/[1.73_m2]    Calcium 6.6 (L) 8.5 - 10.1 mg/dL   CBC with platelets   Result Value Ref Range    WBC 13.4 (H) 4.0 - 11.0 10e9/L    RBC Count 3.47 (L) 4.4 - 5.9 10e12/L    Hemoglobin 12.1 (L) 13.3 - 17.7 g/dL    Hematocrit 33.0 (L) 40.0 - 53.0 %    MCV 95 78 - 100 fl    MCH 34.9 (H) 26.5 - 33.0 pg    MCHC 36.7 (H) 31.5 - 36.5 g/dL    RDW 14.0 10.0 - 15.0 %    Platelet Count 189 150 - 450 10e9/L   Hepatic panel   Result Value Ref Range    Bilirubin Direct 1.5 (H) 0.0 - 0.2 mg/dL    Bilirubin Total 2.7 (H) 0.2 - 1.3 mg/dL    Albumin 2.2 (L) 3.4 - 5.0 g/dL    Protein Total 6.4 (L) 6.8 - 8.8 g/dL    Alkaline Phosphatase 115 40 - 150 U/L    ALT 77 (H) 0 - 70 U/L     (H) 0 - 45 U/L   Lipase   Result Value Ref Range     Lipase 4,185 (H) 73 - 393 U/L   Phosphorus   Result Value Ref Range    Phosphorus 2.0 (L) 2.5 - 4.5 mg/dL   Magnesium   Result Value Ref Range    Magnesium 1.7 1.6 - 2.3 mg/dL       Assessment and Plan:    Bertram Rivas is a 39 year old male admitted on 4/6/2020. He has history of alcohol use, hypertension, hyperlipidemia, GERD and YULISSA. He presents with epigastric pain.     Acute Pancreatitis due to etoh and elevated triglycerides   2 weeks of intermittent abdominal discomfort which worsened acutely day prior to admission with severe sharp, cramping epigastric pain. No nausea, emesis or diarrhea. No prior episodes. Daily alcohol drinker. CT abdomen/pelvis shows evidence for interstitial pancreatitis. Admit labs show elevated lip[place] of [address], elevated LFTs (Alk phos 200, , , bilirubin 4.6), low Na/Mg/K. Vitals show fever 100.6, hypertension. Appeared to be acute pancreatitis, high suspicion for alcohol related given longstanding use. Triglycerides returned at 2252, may be contributing to acute pancreatitis and elevation likely related to alcohol use. Normal appearing gallbladder on CT, low suspicion at present for gallbladder induced pancreatitis with above findings and reassuring imaging.    On insulin gtt and D5. Lipase down to 4185. Triglycerides down to 1426. Start fenofibrate 160 mg daily. Wbc 13.4  Tmax 99.       Hyponatremia-over correction protocol used.  Na 122 upon admit  Over corrected to 132 yesterday.  Over correction protocol started.   Sodium down to 125 4/7/20   Now at 130----continue D5 .33 ns    Elevated LFTs, likely alcohol hepatitis  Alcoholic Liver Disease  Fatty liver  CT shows dense fatty liver, likely related to long-standing ETOH use. Admission labs show: , , Alk Phos 200, total bili 4.6, INR 1.15.    Bili down to 1.5, transaminases down    Hypokalemia  Hypomagnesia  Low phosphate  Suspect related to poor intake and alcohol use.  Repletion protocols in  place    Low calcium  Corrected calcium 8.04-observe     Alcohol Dependence  Reports drinking 750 mL of vodka daily. Last drink ~30 hours prior to admission. Ethanol not detected on presentation. No history of prior alcohol withdrawal reported. On admission, no clear signs of withdrawal.   - CIWA protocol in place with PRN ativan  - thiamine, folic acid, multivitamin therapy initiated     Hypertension, uncontrolled  Previously diagnosed. Not currently on medications for this. Blood pressures reviewed, elevated since arrival.   - IV labetol prn SBP > 180 or DP > 115     Pre-Diabetes  No prior diagnosis.  Hemoglobin A1c 6.1. Blood sugars initially elevated.   - insulin drip initiated as above     Hyperlipidemia  Hypertriglyceridemia  Previously diagnosed. Not currently on medications for this. Total cholesterol 311, unable to calculate LDL due to hypertriglyceridemia. Previously was on high dose atorvastatin however discontinued in the setting of rhabdomyolysis.     As above --starting fenofibrate.     Gastroesophageal reflux disease  Previously diagnosed. Not currently on medications for this.     Bipolar Disorder  Previous diagnosis, has not been on medications since 2010 and doing well.     YULISSA  Previously diagnosed. Not currently using machine, was not set up correctly.     Diet: NPO for Medical/Clinical Reasons Except for: Meds, Ice Chips    DVT Prophylaxis: Enoxaparin (Lovenox) SQ  Silverman Catheter: not present  Code Status: Full code     Plan at this point we can check sodium daily. We are about 35 hour in from admit and sodium up 8 meq. Continue current iv fluids, insulin and dextrose. Allow clears soon. Goal is triglycerides less than 500 before shutting off insulin gtt. Start fenofibrate. Probably at least 2 more nights in patient

## 2020-04-08 NOTE — PLAN OF CARE
Pt is A&O, up with SBA. VSS. RA, LS clear. NPO, sips with water. Voiding in urinal. 2 PIV's. Insulin gtt on using algorithm 1. Triglycerides trending down, recheck in AM. Phosphorus replaced per protocol. Dilaudid PRN for pain.

## 2020-04-08 NOTE — PROGRESS NOTES
[facility]    Hospital Medicine   Cross Cover Note  Date of Service: 4/8/2020     Sodium recheck at 6 pm was 130, which is the maximum for his goal to avoid rapid or over correction. He is already on maintenance fluids with D5 as a component.    Plan:  - Gave additional 2 mcg DDAVP x 2 (9 pm on 4/7, again at 1 am on 4/8)  - Continue with sodium checks q 6 hours and maintenance fluids      Ning Brewer PA-C  [facility] Hospitalist Service  Pager: [phone]

## 2020-04-09 LAB
ALBUMIN SERPL-MCNC: 2.2 G/DL (ref 3.4–5)
ALP SERPL-CCNC: 106 U/L (ref 40–150)
ALT SERPL W P-5'-P-CCNC: 62 U/L (ref 0–70)
ANION GAP SERPL CALCULATED.3IONS-SCNC: 6 MMOL/L (ref 3–14)
AST SERPL W P-5'-P-CCNC: 99 U/L (ref 0–45)
BILIRUB DIRECT SERPL-MCNC: 1.5 MG/DL (ref 0–0.2)
BILIRUB SERPL-MCNC: 3.4 MG/DL (ref 0.2–1.3)
BUN SERPL-MCNC: 4 MG/DL (ref 7–30)
CALCIUM SERPL-MCNC: 6.9 MG/DL (ref 8.5–10.1)
CHLORIDE SERPL-SCNC: 102 MMOL/L (ref 94–109)
CO2 SERPL-SCNC: 25 MMOL/L (ref 20–32)
CREAT SERPL-MCNC: 0.82 MG/DL (ref 0.66–1.25)
ERYTHROCYTE [DISTWIDTH] IN BLOOD BY AUTOMATED COUNT: 14.1 % (ref 10–15)
GFR SERPL CREATININE-BSD FRML MDRD: >90 ML/MIN/{1.73_M2}
GLUCOSE BLDC GLUCOMTR-MCNC: 104 MG/DL (ref 70–99)
GLUCOSE BLDC GLUCOMTR-MCNC: 117 MG/DL (ref 70–99)
GLUCOSE BLDC GLUCOMTR-MCNC: 118 MG/DL (ref 70–99)
GLUCOSE BLDC GLUCOMTR-MCNC: 118 MG/DL (ref 70–99)
GLUCOSE BLDC GLUCOMTR-MCNC: 121 MG/DL (ref 70–99)
GLUCOSE BLDC GLUCOMTR-MCNC: 129 MG/DL (ref 70–99)
GLUCOSE BLDC GLUCOMTR-MCNC: 130 MG/DL (ref 70–99)
GLUCOSE BLDC GLUCOMTR-MCNC: 131 MG/DL (ref 70–99)
GLUCOSE BLDC GLUCOMTR-MCNC: 164 MG/DL (ref 70–99)
GLUCOSE BLDC GLUCOMTR-MCNC: 165 MG/DL (ref 70–99)
GLUCOSE BLDC GLUCOMTR-MCNC: 93 MG/DL (ref 70–99)
GLUCOSE BLDC GLUCOMTR-MCNC: 95 MG/DL (ref 70–99)
GLUCOSE SERPL-MCNC: 130 MG/DL (ref 70–99)
HCT VFR BLD AUTO: 33.3 % (ref 40–53)
HGB BLD-MCNC: 11.2 G/DL (ref 13.3–17.7)
LIPASE SERPL-CCNC: 1346 U/L (ref 73–393)
MCH RBC QN AUTO: 32.1 PG (ref 26.5–33)
MCHC RBC AUTO-ENTMCNC: 33.6 G/DL (ref 31.5–36.5)
MCV RBC AUTO: 95 FL (ref 78–100)
PHOSPHATE SERPL-MCNC: 2 MG/DL (ref 2.5–4.5)
PLATELET # BLD AUTO: 190 10E9/L (ref 150–450)
POTASSIUM SERPL-SCNC: 4.4 MMOL/L (ref 3.4–5.3)
PROT SERPL-MCNC: 6.6 G/DL (ref 6.8–8.8)
RBC # BLD AUTO: 3.49 10E12/L (ref 4.4–5.9)
SODIUM SERPL-SCNC: 133 MMOL/L (ref 133–144)
TRIGL SERPL-MCNC: 816 MG/DL
WBC # BLD AUTO: 13.7 10E9/L (ref 4–11)

## 2020-04-09 PROCEDURE — 36415 COLL VENOUS BLD VENIPUNCTURE: CPT | Performed by: PHYSICIAN ASSISTANT

## 2020-04-09 PROCEDURE — 99233 SBSQ HOSP IP/OBS HIGH 50: CPT | Performed by: FAMILY MEDICINE

## 2020-04-09 PROCEDURE — 83690 ASSAY OF LIPASE: CPT | Performed by: PHYSICIAN ASSISTANT

## 2020-04-09 PROCEDURE — 25800030 ZZH RX IP 258 OP 636: Performed by: FAMILY MEDICINE

## 2020-04-09 PROCEDURE — 80048 BASIC METABOLIC PNL TOTAL CA: CPT | Performed by: PHYSICIAN ASSISTANT

## 2020-04-09 PROCEDURE — 80076 HEPATIC FUNCTION PANEL: CPT | Performed by: PHYSICIAN ASSISTANT

## 2020-04-09 PROCEDURE — 25000125 ZZHC RX 250: Performed by: FAMILY MEDICINE

## 2020-04-09 PROCEDURE — C9113 INJ PANTOPRAZOLE SODIUM, VIA: HCPCS | Performed by: PHYSICIAN ASSISTANT

## 2020-04-09 PROCEDURE — 25000131 ZZH RX MED GY IP 250 OP 636 PS 637: Performed by: PHYSICIAN ASSISTANT

## 2020-04-09 PROCEDURE — 25800030 ZZH RX IP 258 OP 636: Performed by: PHYSICIAN ASSISTANT

## 2020-04-09 PROCEDURE — 00000146 ZZHCL STATISTIC GLUCOSE BY METER IP

## 2020-04-09 PROCEDURE — 25000128 H RX IP 250 OP 636: Performed by: FAMILY MEDICINE

## 2020-04-09 PROCEDURE — 25000132 ZZH RX MED GY IP 250 OP 250 PS 637: Performed by: PHYSICIAN ASSISTANT

## 2020-04-09 PROCEDURE — 85027 COMPLETE CBC AUTOMATED: CPT | Performed by: PHYSICIAN ASSISTANT

## 2020-04-09 PROCEDURE — 84100 ASSAY OF PHOSPHORUS: CPT | Performed by: PHYSICIAN ASSISTANT

## 2020-04-09 PROCEDURE — 20000003 ZZH R&B ICU

## 2020-04-09 PROCEDURE — 25000128 H RX IP 250 OP 636: Performed by: PHYSICIAN ASSISTANT

## 2020-04-09 PROCEDURE — 25000132 ZZH RX MED GY IP 250 OP 250 PS 637: Performed by: FAMILY MEDICINE

## 2020-04-09 PROCEDURE — 25000125 ZZHC RX 250: Performed by: PHYSICIAN ASSISTANT

## 2020-04-09 PROCEDURE — 84478 ASSAY OF TRIGLYCERIDES: CPT | Performed by: PHYSICIAN ASSISTANT

## 2020-04-09 RX ADMIN — MULTIPLE VITAMINS W/ MINERALS TAB 1 TABLET: TAB at 08:32

## 2020-04-09 RX ADMIN — FENOFIBRATE 160 MG: 160 TABLET, FILM COATED ORAL at 08:33

## 2020-04-09 RX ADMIN — POTASSIUM PHOSPHATE, MONOBASIC AND POTASSIUM PHOSPHATE, DIBASIC 15 MMOL: 224; 236 INJECTION, SOLUTION INTRAVENOUS at 06:46

## 2020-04-09 RX ADMIN — SODIUM CHLORIDE 1 UNITS/HR: 9 INJECTION, SOLUTION INTRAVENOUS at 14:02

## 2020-04-09 RX ADMIN — ENOXAPARIN SODIUM 40 MG: 40 INJECTION SUBCUTANEOUS at 00:24

## 2020-04-09 RX ADMIN — PANTOPRAZOLE SODIUM 40 MG: 40 INJECTION, POWDER, LYOPHILIZED, FOR SOLUTION INTRAVENOUS at 08:32

## 2020-04-09 RX ADMIN — ENOXAPARIN SODIUM 40 MG: 40 INJECTION SUBCUTANEOUS at 21:53

## 2020-04-09 RX ADMIN — POTASSIUM CHLORIDE: 2 INJECTION, SOLUTION, CONCENTRATE INTRAVENOUS at 14:29

## 2020-04-09 RX ADMIN — KETOROLAC TROMETHAMINE 15 MG: 15 INJECTION, SOLUTION INTRAMUSCULAR; INTRAVENOUS at 00:30

## 2020-04-09 RX ADMIN — SODIUM CHLORIDE 0.2 UNITS/HR: 9 INJECTION, SOLUTION INTRAVENOUS at 12:03

## 2020-04-09 RX ADMIN — THIAMINE HCL TAB 100 MG 100 MG: 100 TAB at 08:32

## 2020-04-09 RX ADMIN — KETOROLAC TROMETHAMINE 15 MG: 15 INJECTION, SOLUTION INTRAMUSCULAR; INTRAVENOUS at 22:02

## 2020-04-09 RX ADMIN — KETOROLAC TROMETHAMINE 15 MG: 15 INJECTION, SOLUTION INTRAMUSCULAR; INTRAVENOUS at 14:08

## 2020-04-09 RX ADMIN — FAMOTIDINE 20 MG: 20 INJECTION, SOLUTION INTRAVENOUS at 08:58

## 2020-04-09 RX ADMIN — FOLIC ACID 1 MG: 1 TABLET ORAL at 08:31

## 2020-04-09 RX ADMIN — SODIUM CHLORIDE 0.2 UNITS/HR: 9 INJECTION, SOLUTION INTRAVENOUS at 10:12

## 2020-04-09 RX ADMIN — POTASSIUM CHLORIDE: 2 INJECTION, SOLUTION, CONCENTRATE INTRAVENOUS at 03:07

## 2020-04-09 RX ADMIN — SODIUM CHLORIDE 0.2 UNITS/HR: 9 INJECTION, SOLUTION INTRAVENOUS at 16:12

## 2020-04-09 RX ADMIN — SODIUM CHLORIDE 0.5 UNITS/HR: 9 INJECTION, SOLUTION INTRAVENOUS at 08:16

## 2020-04-09 RX ADMIN — KETOROLAC TROMETHAMINE 15 MG: 15 INJECTION, SOLUTION INTRAMUSCULAR; INTRAVENOUS at 06:51

## 2020-04-09 RX ADMIN — FAMOTIDINE 20 MG: 20 INJECTION, SOLUTION INTRAVENOUS at 20:18

## 2020-04-09 ASSESSMENT — ACTIVITIES OF DAILY LIVING (ADL)
ADLS_ACUITY_SCORE: 10

## 2020-04-09 NOTE — PROGRESS NOTES
[facility]      Subjective:  Stool yesterday  Lots of flatus  Hungry  Pain is less  No difficulty breathing  Hopes to discharge  tomorrow    Review of Systems:  CONSTITUTIONAL: NEGATIVE for fever, chills, change in weight  INTEGUMENTARY/SKIN: NEGATIVE for worrisome rashes, moles or lesions  EYES: NEGATIVE for vision changes or irritation  ENT/MOUTH: NEGATIVE for ear, mouth and throat problems  RESP: NEGATIVE for significant cough or SOB  BREAST: NEGATIVE for masses, tenderness or discharge  CV: NEGATIVE for chest pain, palpitations or peripheral edema  GI: less pain, see above  : NEGATIVE for frequency, dysuria, or hematuria  MUSCULOSKELETAL: NEGATIVE for significant arthralgias or myalgia  NEURO: NEGATIVE for weakness, dizziness or paresthesias  ENDOCRINE: NEGATIVE for temperature intolerance, skin/hair changes  HEME: NEGATIVE for bleeding problems  PSYCHIATRIC: NEGATIVE for changes in mood or affect    Physical Exam:  Vitals Were Reviewed    Patient Vitals for the past 16 hrs:   BP Temp Temp src Pulse Heart Rate Resp SpO2   04/09/20 0600 (!) 141/81 -- -- 95 92 19 96 %   04/09/20 0500 (!) 143/73 -- -- 90 93 20 96 %   04/09/20 0400 (!) 157/99 -- -- 96 96 18 --   04/09/20 0300 (!) 139/92 -- -- 92 90 19 96 %   04/09/20 0200 135/77 -- -- 85 89 18 97 %   04/09/20 0100 125/71 -- -- 89 88 19 97 %   04/09/20 0000 (!) 140/89 -- -- 96 92 22 96 %   04/08/20 2300 129/85 -- -- 90 92 18 96 %   04/08/20 2230 -- -- -- -- -- 24 --   04/08/20 2200 136/83 -- -- 92 84 16 96 %   04/08/20 2100 (!) 146/86 99  F (37.2  C) Oral 90 89 22 90 %   04/08/20 2000 123/82 -- -- 87 85 24 97 %   04/08/20 1900 133/77 -- -- 87 84 19 96 %   04/08/20 1800 138/79 -- -- 90 85 19 96 %   04/08/20 1700 129/77 -- -- 91 94 23 95 %   04/08/20 1600 129/84 99.1  F (37.3  C) Oral 87 91 23 96 %   04/08/20 1539 -- -- -- -- 88 16 96 %   04/08/20 1500 127/82 -- -- 89 88 20 95 %         Intake/Output Summary (Last 24 hours) at 4/9/2020  0648  Last data filed at 4/9/2020 0631  Gross per 24 hour   Intake 5124.12 ml   Output 2370 ml   Net 2754.12 ml       GENERAL APPEARANCE: healthy, alert and no distress  EYES: conjunctiva clear, eyes grossly normal  RESP: lungs clear to auscultation - no rales, rhonchi or wheezes  CV: regular rate and rhythm, normal S1 S2, no S3 or S4 and no murmur, click or rub   ABDOMEN: improved, just mid tenderness, bs present  MS: no clubbing, cyanosis; no edema  SKIN: clear without significant rashes or lesions    Lab:  Recent Labs   Lab Test 04/09/20 0448 04/08/20  0555    130*   POTASSIUM 4.4 3.9   CHLORIDE 102 100   CO2 25 20   ANIONGAP 6 10   * 157*   BUN 4* 7   CR 0.82 0.81   DAVID 6.9* 6.6*     CBC RESULTS:   Recent Labs   Lab Test 04/09/20 0448 04/08/20  0555   WBC 13.7* 13.4*   RBC 3.49* 3.47*   HGB 11.2* 12.1*   HCT 33.3* 33.0*    189       Results for orders placed or performed during the hospital encounter of 04/06/20 (from the past 24 hour(s))   Glucose by meter   Result Value Ref Range    Glucose 150 (H) 70 - 99 mg/dL   Lactic acid level STAT   Result Value Ref Range    Lactate for Sepsis Protocol 0.6 (L) 0.7 - 2.0 mmol/L   Glucose by meter   Result Value Ref Range    Glucose 162 (H) 70 - 99 mg/dL   Glucose by meter   Result Value Ref Range    Glucose 141 (H) 70 - 99 mg/dL   Glucose by meter   Result Value Ref Range    Glucose 149 (H) 70 - 99 mg/dL   Glucose by meter   Result Value Ref Range    Glucose 162 (H) 70 - 99 mg/dL   Glucose by meter   Result Value Ref Range    Glucose 149 (H) 70 - 99 mg/dL   Glucose by meter   Result Value Ref Range    Glucose 133 (H) 70 - 99 mg/dL   Glucose by meter   Result Value Ref Range    Glucose 153 (H) 70 - 99 mg/dL   Glucose by meter   Result Value Ref Range    Glucose 129 (H) 70 - 99 mg/dL   Glucose by meter   Result Value Ref Range    Glucose 118 (H) 70 - 99 mg/dL   Triglycerides   Result Value Ref Range    Triglycerides 816 (H) <150 mg/dL   Basic metabolic  panel   Result Value Ref Range    Sodium 133 133 - 144 mmol/L    Potassium 4.4 3.4 - 5.3 mmol/L    Chloride 102 94 - 109 mmol/L    Carbon Dioxide 25 20 - 32 mmol/L    Anion Gap 6 3 - 14 mmol/L    Glucose 130 (H) 70 - 99 mg/dL    Urea Nitrogen 4 (L) 7 - 30 mg/dL    Creatinine 0.82 0.66 - 1.25 mg/dL    GFR Estimate >90 >60 mL/min/[1.73_m2]    GFR Estimate If Black >90 >60 mL/min/[1.73_m2]    Calcium 6.9 (L) 8.5 - 10.1 mg/dL   CBC with platelets   Result Value Ref Range    WBC 13.7 (H) 4.0 - 11.0 10e9/L    RBC Count 3.49 (L) 4.4 - 5.9 10e12/L    Hemoglobin 11.2 (L) 13.3 - 17.7 g/dL    Hematocrit 33.3 (L) 40.0 - 53.0 %    MCV 95 78 - 100 fl    MCH 32.1 26.5 - 33.0 pg    MCHC 33.6 31.5 - 36.5 g/dL    RDW 14.1 10.0 - 15.0 %    Platelet Count 190 150 - 450 10e9/L   Hepatic panel   Result Value Ref Range    Bilirubin Direct 1.5 (H) 0.0 - 0.2 mg/dL    Bilirubin Total 3.4 (H) 0.2 - 1.3 mg/dL    Albumin 2.2 (L) 3.4 - 5.0 g/dL    Protein Total 6.6 (L) 6.8 - 8.8 g/dL    Alkaline Phosphatase 106 40 - 150 U/L    ALT 62 0 - 70 U/L    AST 99 (H) 0 - 45 U/L   Phosphorus   Result Value Ref Range    Phosphorus 2.0 (L) 2.5 - 4.5 mg/dL   Glucose by meter   Result Value Ref Range    Glucose 130 (H) 70 - 99 mg/dL       Assessment and Plan:    Bertram Rivas is a 39 year old male admitted on 4/6/2020. He has history of alcohol use, hypertension, hyperlipidemia, GERD and YULISSA. He presents with epigastric pain.     Acute Pancreatitis due to etoh and elevated triglycerides   2 weeks of intermittent abdominal discomfort which worsened acutely day prior to admission with severe sharp, cramping epigastric pain. No nausea, emesis or diarrhea. No prior episodes. Daily alcohol drinker. CT abdomen/pelvis shows evidence for interstitial pancreatitis. Admit [place] [address], elevated LFTs (Alk phos 200, , , bilirubin 4.6), low Na/Mg/K. Vitals show fever 100.6, hypertension. Appeared to be acute pancreatitis, high  suspicion for alcohol related given longstanding use. Triglycerides returned at 2252, may be contributing to acute pancreatitis and elevation likely related to alcohol use. Normal appearing gallbladder on CT, low suspicion at present for gallbladder induced pancreatitis with above findings and reassuring imaging.     On insulin gtt and D5. Lipase pending. Triglycerides down to 816. Started fenofibrate 160 mg daily 4/8/20 . Wbc 13.7  Tmax 99.9        Hyponatremia-over correction protocol used.  Na 122 upon admit  Over corrected to 132 3/7  Over correction protocol started.   Sodium down to 125 4/7/20   Now at 133----continue D5 .33 ns     Elevated LFTs,  alcohol hepatitis  Alcoholic Liver Disease  Fatty liver  CT shows dense fatty liver, likely related to long-standing ETOH use. Admission labs show: , , Alk Phos 200, total bili 4.6, INR 1.15.     Bili up to 3.4 transaminases down     Hypokalemia  Hypomagnesia  Low phosphate  Suspect related to poor intake and alcohol use.  Repletion protocols in place     Low calcium  Corrected calcium 8.34----observe     Alcohol Dependence  Reports drinking 750 mL of vodka daily. Last drink ~30 hours prior to admission. Ethanol not detected on presentation. No history of prior alcohol withdrawal reported. On admission, no clear signs of withdrawal.   - CIWA protocol in place with PRN ativan  - thiamine, folic acid, multivitamin therapy initiated     Hypertension, uncontrolled  Previously diagnosed. Not currently on medications for this. Blood pressures reviewed, elevated since arrival.   - IV labetol prn SBP > 180 or DP > 115     Pre-Diabetes  No prior diagnosis.  Hemoglobin A1c 6.1. Blood sugars initially elevated.   - insulin drip initiated as above     Hyperlipidemia  Hypertriglyceridemia  Previously diagnosed. Not currently on medications for this. Total cholesterol 311, unable to calculate LDL due to hypertriglyceridemia. Previously was on high dose atorvastatin  however discontinued in the setting of rhabdomyolysis.      As above --starting fenofibrate.     Gastroesophageal reflux disease  Previously diagnosed. Not currently on medications for this.  On protonix and pepcid.     Bipolar Disorder  Previous diagnosis, has not been on medications since 2010 and doing well.     YULISSA  Previously diagnosed. Not currently using machine, was not set up correctly.     Diet: advance to clears  DVT Prophylaxis: Enoxaparin (Lovenox) SQ  Silverman Catheter: not present  Code Status: Full code     Plan   Reduce iv fluids, insulin and dextrose. Allow some clears Goal is triglycerides less than 500 before shutting off insulin gtt. Started fenofibrate 4/8/20 . Probably at least 1-2  more nights in patient.

## 2020-04-09 NOTE — PLAN OF CARE
Remained on insulin drip, blood sugars stable all night. VSS, toradol and dilaudid given once each for pain. Using call light appropriately, urinating in urinal.   Leonora Galloway RN on 4/9/2020 at 5:19 AM

## 2020-04-09 NOTE — PLAN OF CARE
Patient tolerated a liquid breakfast fair.  Denies pain but had loose, watery stool after eating.  Abdomen distended, bowel sounds active in all 4 quadrants.  Blood sugars stable, remains on Insulin drip.  VSS.  Up independently to BR with minimal assist with IV pumps.

## 2020-04-10 VITALS
HEIGHT: 67 IN | HEART RATE: 98 BPM | SYSTOLIC BLOOD PRESSURE: 162 MMHG | WEIGHT: 256.62 LBS | RESPIRATION RATE: 16 BRPM | TEMPERATURE: 99.2 F | OXYGEN SATURATION: 97 % | DIASTOLIC BLOOD PRESSURE: 100 MMHG | BODY MASS INDEX: 40.28 KG/M2

## 2020-04-10 LAB
ALBUMIN SERPL-MCNC: 2.3 G/DL (ref 3.4–5)
ALP SERPL-CCNC: 105 U/L (ref 40–150)
ALT SERPL W P-5'-P-CCNC: 61 U/L (ref 0–70)
ANION GAP SERPL CALCULATED.3IONS-SCNC: 6 MMOL/L (ref 3–14)
AST SERPL W P-5'-P-CCNC: 76 U/L (ref 0–45)
BILIRUB DIRECT SERPL-MCNC: 2 MG/DL (ref 0–0.2)
BILIRUB SERPL-MCNC: 2.8 MG/DL (ref 0.2–1.3)
BUN SERPL-MCNC: 4 MG/DL (ref 7–30)
CALCIUM SERPL-MCNC: 7.6 MG/DL (ref 8.5–10.1)
CHLORIDE SERPL-SCNC: 103 MMOL/L (ref 94–109)
CO2 SERPL-SCNC: 26 MMOL/L (ref 20–32)
CREAT SERPL-MCNC: 0.83 MG/DL (ref 0.66–1.25)
ERYTHROCYTE [DISTWIDTH] IN BLOOD BY AUTOMATED COUNT: 14.1 % (ref 10–15)
GFR SERPL CREATININE-BSD FRML MDRD: >90 ML/MIN/{1.73_M2}
GLUCOSE BLDC GLUCOMTR-MCNC: 105 MG/DL (ref 70–99)
GLUCOSE BLDC GLUCOMTR-MCNC: 109 MG/DL (ref 70–99)
GLUCOSE BLDC GLUCOMTR-MCNC: 122 MG/DL (ref 70–99)
GLUCOSE BLDC GLUCOMTR-MCNC: 130 MG/DL (ref 70–99)
GLUCOSE SERPL-MCNC: 115 MG/DL (ref 70–99)
HCT VFR BLD AUTO: 32.2 % (ref 40–53)
HGB BLD-MCNC: 10.7 G/DL (ref 13.3–17.7)
LIPASE SERPL-CCNC: 952 U/L (ref 73–393)
MCH RBC QN AUTO: 31.7 PG (ref 26.5–33)
MCHC RBC AUTO-ENTMCNC: 33.2 G/DL (ref 31.5–36.5)
MCV RBC AUTO: 95 FL (ref 78–100)
PLATELET # BLD AUTO: 218 10E9/L (ref 150–450)
POTASSIUM SERPL-SCNC: 3.8 MMOL/L (ref 3.4–5.3)
PROT SERPL-MCNC: 6.7 G/DL (ref 6.8–8.8)
RBC # BLD AUTO: 3.38 10E12/L (ref 4.4–5.9)
SODIUM SERPL-SCNC: 135 MMOL/L (ref 133–144)
TRIGL SERPL-MCNC: 542 MG/DL
WBC # BLD AUTO: 13.2 10E9/L (ref 4–11)

## 2020-04-10 PROCEDURE — 25000128 H RX IP 250 OP 636: Performed by: FAMILY MEDICINE

## 2020-04-10 PROCEDURE — 25800030 ZZH RX IP 258 OP 636: Performed by: FAMILY MEDICINE

## 2020-04-10 PROCEDURE — 80048 BASIC METABOLIC PNL TOTAL CA: CPT | Performed by: FAMILY MEDICINE

## 2020-04-10 PROCEDURE — 80076 HEPATIC FUNCTION PANEL: CPT | Performed by: FAMILY MEDICINE

## 2020-04-10 PROCEDURE — 85027 COMPLETE CBC AUTOMATED: CPT | Performed by: FAMILY MEDICINE

## 2020-04-10 PROCEDURE — 25000132 ZZH RX MED GY IP 250 OP 250 PS 637: Performed by: FAMILY MEDICINE

## 2020-04-10 PROCEDURE — 82565 ASSAY OF CREATININE: CPT | Performed by: FAMILY MEDICINE

## 2020-04-10 PROCEDURE — 36415 COLL VENOUS BLD VENIPUNCTURE: CPT | Performed by: FAMILY MEDICINE

## 2020-04-10 PROCEDURE — 00000146 ZZHCL STATISTIC GLUCOSE BY METER IP

## 2020-04-10 PROCEDURE — 25000128 H RX IP 250 OP 636: Performed by: PHYSICIAN ASSISTANT

## 2020-04-10 PROCEDURE — 25000132 ZZH RX MED GY IP 250 OP 250 PS 637: Performed by: PHYSICIAN ASSISTANT

## 2020-04-10 PROCEDURE — C9113 INJ PANTOPRAZOLE SODIUM, VIA: HCPCS | Performed by: PHYSICIAN ASSISTANT

## 2020-04-10 PROCEDURE — 25000125 ZZHC RX 250: Performed by: FAMILY MEDICINE

## 2020-04-10 PROCEDURE — 84478 ASSAY OF TRIGLYCERIDES: CPT | Performed by: FAMILY MEDICINE

## 2020-04-10 PROCEDURE — 99239 HOSP IP/OBS DSCHRG MGMT >30: CPT | Performed by: FAMILY MEDICINE

## 2020-04-10 PROCEDURE — 83690 ASSAY OF LIPASE: CPT | Performed by: FAMILY MEDICINE

## 2020-04-10 RX ORDER — FENOFIBRATE 160 MG/1
160 TABLET ORAL DAILY
Qty: 30 TABLET | Refills: 0 | Status: SHIPPED | OUTPATIENT
Start: 2020-04-10

## 2020-04-10 RX ADMIN — FOLIC ACID 1 MG: 1 TABLET ORAL at 08:16

## 2020-04-10 RX ADMIN — FENOFIBRATE 160 MG: 160 TABLET, FILM COATED ORAL at 08:16

## 2020-04-10 RX ADMIN — PANTOPRAZOLE SODIUM 40 MG: 40 INJECTION, POWDER, LYOPHILIZED, FOR SOLUTION INTRAVENOUS at 08:16

## 2020-04-10 RX ADMIN — THIAMINE HCL TAB 100 MG 100 MG: 100 TAB at 08:16

## 2020-04-10 RX ADMIN — FAMOTIDINE 20 MG: 20 INJECTION, SOLUTION INTRAVENOUS at 08:16

## 2020-04-10 RX ADMIN — MULTIPLE VITAMINS W/ MINERALS TAB 1 TABLET: TAB at 08:16

## 2020-04-10 RX ADMIN — LORAZEPAM 1 MG: 1 TABLET ORAL at 02:05

## 2020-04-10 RX ADMIN — POTASSIUM CHLORIDE: 2 INJECTION, SOLUTION, CONCENTRATE INTRAVENOUS at 02:12

## 2020-04-10 ASSESSMENT — ACTIVITIES OF DAILY LIVING (ADL)
ADLS_ACUITY_SCORE: 10

## 2020-04-10 NOTE — PLAN OF CARE
Problem resolved on discharge. Patient denies pain. Lab work improved. He is tolerating a regular diet and voices he is ready to go home.

## 2020-04-10 NOTE — DISCHARGE SUMMARY
Admit Date:     04/06/2020   Discharge Date:           HISTORY OF PRESENT ILLNESS:  Bertram Rivas is a 39-year-old male with a history of hypertension, hyperlipidemia, GERD, obstructive sleep apnea and chemical dependency.  He presented with a 2-week history of abdominal pain and nausea.  He had been drinking 750 mL of vodka daily.  He has a history of methamphetamine use and previous CD treatment.      Upon admission, he was found to have acute pancreatitis with a lipase of 28,000.  CT of the abdomen and pelvis showed interstitial pancreatitis.  His bilirubin was 4.6.  His transaminases were elevated.  He had low sodium, magnesium and potassium.  He had a temperature of 100.6.  Ultimately, it was found that his triglycerides were 2252.  It was thought that he had acute pancreatitis related to alcohol and high triglycerides.  He was started on an insulin drip and a dextrose infusion.  Ultimately, was started on fenofibrate.  His sodium was corrected.  It was over corrected and he went on to the overcorrection protocol.  Ultimately, it was slowly corrected.  Hyponatremia was thought to be related to alcohol use.      The patient's CT revealed a fatty liver, likely related to longstanding alcohol use.  There was concern that he had alcoholic liver disease.  This will need to be followed in the future.      Apparently, he has had hyperlipidemia in the past and he says he was on a statin which caused some type of trouble that ultimately affected his kidneys.  It is unclear whether this was rhabdomyolysis related to the statin.  He was started on fenofibrate here and he seemed to tolerate that.      Our goal is to get his triglycerides down under 500 before we stop the insulin and dextrose.  They came down to about 540.  At that time, the patient was clinically dramatically better and he was essentially demanding, released from the hospital.  At this point in time, he is going to advance to solids in his diet and if he  is tolerating that, he is going to discharge later today on the fenofibrate.      He states that he understands that alcohol is a problem and he says he is going to quit drinking.      ASSESSMENT:   1.  Acute pancreatitis related to high triglycerides and alcohol.   2.  Hyponatremia related to alcohol abuse.   3.  Elevated liver function test, suspect alcoholic hepatitis and alcoholic liver disease.   4.  Hypokalemia and low magnesium related to alcohol use.   5.  History of hypertension.   6.  Prediabetes with hemoglobin A1c of 6.1.   7.  History of hyperlipidemia.   8.  GERD.   9.  Bipolar disorder.   10.  Obstructive sleep apnea -- the patient has been noncompliant with use of his CPAP.      PLAN:  He is essentially demanding to leave.  I think if he can take solids today without difficulties, he can leave later.  He will need clinic followup to follow his liver function and to follow the use of fenofibrate.  He is going to discharge on fenofibrate 160 mg daily.  He should return if he has difficulties.  We recommended CD treatment and participation in Alcoholics Anonymous.       Current Discharge Medication List      START taking these medications    Details   fenofibrate (TRIGLIDE/LOFIBRA) 160 MG tablet Take 1 tablet (160 mg) by mouth daily  Qty: 30 tablet, Refills: 0    Associated Diagnoses: Hypertriglyceridemia         CONTINUE these medications which have NOT CHANGED    Details   VITAMIN D, CHOLECALCIFEROL, PO Take by mouth daily           Unresulted Labs Ordered in the Past 30 Days of this Admission     No orders found from 3/7/2020 to 2020.              TOTAL TIME SPENT:  Greater than 30 minutes spent on this.         MARIANNA DE LEÓN MD             D: 04/10/2020   T: 04/10/2020   MT: [place]      Name:     LAVERN GRAHAM   MRN:      [id number]        Account:        [id number]   :      1980           Admit Date:     2020                                  Discharge Date:       Document:  [id number]

## 2020-04-10 NOTE — PROGRESS NOTES
[facility]      Subjective:  Patient insists that he is going to leave today.  He says he feels fine.  He wants to advance his diet.  He is denying abdominal pain.    Review of Systems:  CONSTITUTIONAL: NEGATIVE for fever, chills, change in weight  ENT/MOUTH: NEGATIVE for ear, mouth and throat problems  RESP: NEGATIVE for significant cough or SOB  CV: NEGATIVE for chest pain, palpitations or peripheral edema    Physical Exam:  Vitals Were Reviewed    Patient Vitals for the past 16 hrs:   BP Temp Temp src Pulse Heart Rate Resp SpO2   04/10/20 0456 (!) 167/107 98.6  F (37  C) -- -- 96 20 98 %   04/10/20 0200 (!) 164/96 -- -- -- 93 20 --   04/10/20 0007 (!) 143/95 99.5  F (37.5  C) Oral -- 90 16 98 %   04/09/20 2200 -- -- -- -- 90 20 --   04/09/20 2100 -- -- -- -- 89 18 --   04/09/20 2000 (!) 145/90 98.8  F (37.1  C) Oral 90 86 18 98 %   04/09/20 1900 (!) 150/75 -- -- 92 98 23 96 %   04/09/20 1800 (!) 139/90 -- -- 93 91 18 97 %   04/09/20 1700 (!) 151/106 -- -- 87 87 21 97 %   04/09/20 1600 (!) 127/110 98.4  F (36.9  C) Oral 89 90 21 97 %         Intake/Output Summary (Last 24 hours) at 4/10/2020 0707  Last data filed at 4/10/2020 0500  Gross per 24 hour   Intake 3073.01 ml   Output 1425 ml   Net 1648.01 ml       GENERAL APPEARANCE: healthy, alert and no distress  EYES: conjunctiva clear, eyes grossly normal  RESP: lungs clear to auscultation - no rales, rhonchi or wheezes  CV: regular rate and rhythm, normal S1 S2, no S3 or S4 and no murmur, click or rub   ABDOMEN: soft, nontender, no HSM or masses and bowel sounds normal  MS: no clubbing, cyanosis; no edema  SKIN: clear without significant rashes or lesions    Lab:  Recent Labs   Lab Test 04/10/20  0454 04/09/20  0448    133   POTASSIUM 3.8 4.4   CHLORIDE 103 102   CO2 26 25   ANIONGAP 6 6   * 130*   BUN 4* 4*   CR 0.83 0.82   DAVID 7.6* 6.9*     CBC RESULTS:   Recent Labs   Lab Test 04/10/20  0454 04/09/20 0448   WBC 13.2* 13.7*    RBC 3.38* 3.49*   HGB 10.7* 11.2*   HCT 32.2* 33.3*    190       Results for orders placed or performed during the hospital encounter of 04/06/20 (from the past 24 hour(s))   Glucose by meter   Result Value Ref Range    Glucose 131 (H) 70 - 99 mg/dL   Glucose by meter   Result Value Ref Range    Glucose 121 (H) 70 - 99 mg/dL   Glucose by meter   Result Value Ref Range    Glucose 117 (H) 70 - 99 mg/dL   Glucose by meter   Result Value Ref Range    Glucose 165 (H) 70 - 99 mg/dL   Glucose by meter   Result Value Ref Range    Glucose 118 (H) 70 - 99 mg/dL   Glucose by meter   Result Value Ref Range    Glucose 164 (H) 70 - 99 mg/dL   Glucose by meter   Result Value Ref Range    Glucose 95 70 - 99 mg/dL   Glucose by meter   Result Value Ref Range    Glucose 93 70 - 99 mg/dL   Glucose by meter   Result Value Ref Range    Glucose 104 (H) 70 - 99 mg/dL   Glucose by meter   Result Value Ref Range    Glucose 130 (H) 70 - 99 mg/dL   Glucose by meter   Result Value Ref Range    Glucose 109 (H) 70 - 99 mg/dL   Triglycerides   Result Value Ref Range    Triglycerides 542 (H) <150 mg/dL   Basic metabolic panel   Result Value Ref Range    Sodium 135 133 - 144 mmol/L    Potassium 3.8 3.4 - 5.3 mmol/L    Chloride 103 94 - 109 mmol/L    Carbon Dioxide 26 20 - 32 mmol/L    Anion Gap 6 3 - 14 mmol/L    Glucose 115 (H) 70 - 99 mg/dL    Urea Nitrogen 4 (L) 7 - 30 mg/dL    Creatinine 0.83 0.66 - 1.25 mg/dL    GFR Estimate >90 >60 mL/min/[1.73_m2]    GFR Estimate If Black >90 >60 mL/min/[1.73_m2]    Calcium 7.6 (L) 8.5 - 10.1 mg/dL   CBC with platelets   Result Value Ref Range    WBC 13.2 (H) 4.0 - 11.0 10e9/L    RBC Count 3.38 (L) 4.4 - 5.9 10e12/L    Hemoglobin 10.7 (L) 13.3 - 17.7 g/dL    Hematocrit 32.2 (L) 40.0 - 53.0 %    MCV 95 78 - 100 fl    MCH 31.7 26.5 - 33.0 pg    MCHC 33.2 31.5 - 36.5 g/dL    RDW 14.1 10.0 - 15.0 %    Platelet Count 218 150 - 450 10e9/L   Hepatic panel   Result Value Ref Range    Bilirubin Direct 2.0 (H)  0.0 - 0.2 mg/dL    Bilirubin Total 2.8 (H) 0.2 - 1.3 mg/dL    Albumin 2.3 (L) 3.4 - 5.0 g/dL    Protein Total 6.7 (L) 6.8 - 8.8 g/dL    Alkaline Phosphatase 105 40 - 150 U/L    ALT 61 0 - 70 U/L    AST 76 (H) 0 - 45 U/L   Lipase   Result Value Ref Range    Lipase 952 (H) 73 - 393 U/L   Glucose by meter   Result Value Ref Range    Glucose 105 (H) 70 - 99 mg/dL       Assessment and Plan:    Bertram Rivas is a 39 year old male admitted on 4/6/2020. He has history of alcohol use, hypertension, hyperlipidemia, GERD and YULISSA. He presents with epigastric pain.     Acute Pancreatitis due to etoh and elevated triglycerides   2 weeks of intermittent abdominal discomfort which worsened acutely day prior to admission with severe sharp, cramping epigastric pain. No nausea, emesis or diarrhea. No prior episodes. Daily alcohol drinker. CT abdomen/pelvis shows evidence for interstitial pancreatitis. Admit labs [place] elevated [place] of [address], elevated LFTs (Alk phos 200, , , bilirubin 4.6), low Na/Mg/K. Vitals show fever 100.6, hypertension. Appeared to be acute pancreatitis, high suspicion for alcohol related given longstanding use. Triglycerides returned at 2252, may be contributing to acute pancreatitis and elevation likely related to alcohol use. Normal appearing gallbladder on CT, low suspicion at present for gallbladder induced pancreatitis with above findings and reassuring imaging.     On insulin gtt and D5. Lipase 952 . Triglycerides down to 542. Started fenofibrate 160 mg daily 4/8/20 . Wbc 13.2  afebrile  triglycerides very close to goal-will stop insulin and D5. On clears-advance diet .  As above patient says he is leaving today.        Hyponatremia-over correction protocol used.  Na 122 upon admit  Over corrected to 132 3/7  Over correction protocol started.   Sodium down to 125 4/7/20   Now at 133----continue D5 .33 ns     Elevated LFTs,  alcohol hepatitis  Alcoholic Liver Disease  Fatty liver  CT shows  dense fatty liver, likely related to long-standing ETOH use. Admission labs show: , , Alk Phos 200, total bili 4.6, INR 1.15.     Bili 2.8  transaminases down     Hypokalemia  Hypomagnesia  Low phosphate  Suspect related to poor intake and alcohol use.  Repletion protocols in place     Low calcium  Corrected calcium has normalized 04/10/20      Alcohol Dependence  Reports drinking 750 mL of vodka daily. Last drink ~30 hours prior to admission. Ethanol not detected on presentation. No history of prior alcohol withdrawal reported. On admission, no clear signs of withdrawal.   - CIWA protocol in place with PRN ativan  - thiamine, folic acid, multivitamin therapy initiated     Hypertension, uncontrolled  Previously diagnosed. Not currently on medications for this. Blood pressures reviewed, elevated since arrival.   - IV labetol prn SBP > 180 or DP > 115     Pre-Diabetes  No prior diagnosis.  Hemoglobin A1c 6.1. Blood sugars initially elevated.   - insulin drip initiated as above     Hyperlipidemia  Hypertriglyceridemia  Previously diagnosed. Not currently on medications for this. Total cholesterol 311, unable to calculate LDL due to hypertriglyceridemia. Previously was on high dose atorvastatin however discontinued in the setting of rhabdomyolysis.      As above --starting fenofibrate.     Gastroesophageal reflux disease  Previously diagnosed. Not currently on medications for this.  On protonix and pepcid--stop pepcid.     Bipolar Disorder  Previous diagnosis, has not been on medications since 2010 and doing well.     YULISSA  Previously diagnosed. Not currently using machine, was not set up correctly.     Diet: advance    DVT Prophylaxis: Enoxaparin (Lovenox) SQ  Silverman Catheter: not present  Code Status: Full code     Plan    stop IV fluids, insulin and dextrose.  Patient is adamant that he is going to leave today.  Advance diet.  If he tolerates this will discharge afternoon.

## 2020-04-10 NOTE — PROGRESS NOTES
Pt stating inability to sleep, feeling anxious. Ativan 1 mg po given. CIWA=8, mostly for anxiety. Pt expressing strong desire to go home today. VSS. Insulin drip 0.2 units/hr, accucheck 109. Will continue to monitor.

## 2020-04-10 NOTE — PLAN OF CARE
Discharge at 1300. Patient offers no c/c. Improvement noted and nursing concerns are resolved on discharge.

## 2020-04-10 NOTE — PLAN OF CARE
Patient up independently ambulating in [place].  Tolerating clear liquid diet well.  Is having loose stools after eating but denies pain.  States he is passing a lot of gas.  VSS.  Blood sugars remain stable, Insulin gtt. Adjusted as needed.

## 2020-04-10 NOTE — PROGRESS NOTES
VSS. Accucheck 130, insulin drip increased to 0.5 units/hr. Pt states comfort and is hopeful to get a good nights sleep. IV patent. Will continue to monitor.

## 2020-04-10 NOTE — PROGRESS NOTES
[facility] DISCHARGE NOTE    Patient discharged to home at 12:51 PM via ambulation. Accompanied by other: friend and staff. Discharge instructions reviewed with patient, opportunity offered to ask questions. Prescriptions sent to patients preferred pharmacy ([facility]). All belongings sent with patient.    Flavia Mcgregor RN

## 2020-04-10 NOTE — PROGRESS NOTES
Accucheck 104, insulin drip restarted at 0.2 units/hr, Algorithm 1. Pt continues to state comfort. Pt is hopeful to go home tomorrow. Will continue to monitor.

## 2021-05-25 ENCOUNTER — RECORDS - HEALTHEAST (OUTPATIENT)
Dept: ADMINISTRATIVE | Facility: CLINIC | Age: 41
End: 2021-05-25

## 2024-10-08 ENCOUNTER — LAB REQUISITION (OUTPATIENT)
Dept: LAB | Facility: CLINIC | Age: 44
End: 2024-10-08
Payer: COMMERCIAL

## 2024-10-08 DIAGNOSIS — L08.9 LOCAL INFECTION OF THE SKIN AND SUBCUTANEOUS TISSUE, UNSPECIFIED: ICD-10-CM

## 2024-10-08 LAB
BACTERIA SPEC CULT: NORMAL
BACTERIA SPEC CULT: NORMAL
GRAM STAIN RESULT: NORMAL

## 2024-10-08 PROCEDURE — 87077 CULTURE AEROBIC IDENTIFY: CPT | Mod: ORL | Performed by: SURGERY

## 2024-10-08 PROCEDURE — 87075 CULTR BACTERIA EXCEPT BLOOD: CPT | Mod: ORL | Performed by: SURGERY

## 2024-10-08 PROCEDURE — 87176 TISSUE HOMOGENIZATION CULTR: CPT | Mod: ORL | Performed by: SURGERY

## 2024-10-08 PROCEDURE — 87186 SC STD MICRODIL/AGAR DIL: CPT | Mod: ORL | Performed by: SURGERY

## 2024-10-08 PROCEDURE — 87205 SMEAR GRAM STAIN: CPT | Mod: ORL | Performed by: SURGERY

## 2024-10-08 PROCEDURE — 87070 CULTURE OTHR SPECIMN AEROBIC: CPT | Mod: ORL | Performed by: SURGERY

## 2024-10-11 LAB — BACTERIA WND CULT: ABNORMAL

## 2024-10-12 LAB — BACTERIA TISS BX CULT: ABNORMAL

## 2024-10-15 LAB
BACTERIA TISS BX CULT: NORMAL
BACTERIA WND CULT: NORMAL